# Patient Record
Sex: MALE | Race: WHITE | NOT HISPANIC OR LATINO | Employment: OTHER | URBAN - METROPOLITAN AREA
[De-identification: names, ages, dates, MRNs, and addresses within clinical notes are randomized per-mention and may not be internally consistent; named-entity substitution may affect disease eponyms.]

---

## 2018-05-13 ENCOUNTER — ANESTHESIA - HEALTHEAST (OUTPATIENT)
Dept: SURGERY | Facility: HOSPITAL | Age: 73
End: 2018-05-13

## 2018-05-14 ENCOUNTER — SURGERY - HEALTHEAST (OUTPATIENT)
Dept: SURGERY | Facility: HOSPITAL | Age: 73
End: 2018-05-14

## 2020-07-31 ENCOUNTER — AMBULATORY - HEALTHEAST (OUTPATIENT)
Dept: SURGERY | Facility: CLINIC | Age: 75
End: 2020-07-31

## 2020-07-31 DIAGNOSIS — Z11.59 ENCOUNTER FOR SCREENING FOR OTHER VIRAL DISEASES: ICD-10-CM

## 2020-09-15 ENCOUNTER — AMBULATORY - HEALTHEAST (OUTPATIENT)
Dept: FAMILY MEDICINE | Facility: CLINIC | Age: 75
End: 2020-09-15

## 2020-09-15 DIAGNOSIS — Z11.59 ENCOUNTER FOR SCREENING FOR OTHER VIRAL DISEASES: ICD-10-CM

## 2020-09-16 ASSESSMENT — MIFFLIN-ST. JEOR: SCORE: 1950.55

## 2020-09-17 ENCOUNTER — COMMUNICATION - HEALTHEAST (OUTPATIENT)
Dept: SCHEDULING | Facility: CLINIC | Age: 75
End: 2020-09-17

## 2020-09-17 ENCOUNTER — ANESTHESIA - HEALTHEAST (OUTPATIENT)
Dept: SURGERY | Facility: CLINIC | Age: 75
End: 2020-09-17

## 2020-09-18 ENCOUNTER — SURGERY - HEALTHEAST (OUTPATIENT)
Dept: SURGERY | Facility: CLINIC | Age: 75
End: 2020-09-18

## 2020-09-18 ASSESSMENT — MIFFLIN-ST. JEOR: SCORE: 1929.23

## 2021-05-31 ENCOUNTER — RECORDS - HEALTHEAST (OUTPATIENT)
Dept: ADMINISTRATIVE | Facility: CLINIC | Age: 76
End: 2021-05-31

## 2021-06-01 VITALS — WEIGHT: 261 LBS | BODY MASS INDEX: 32.62 KG/M2

## 2021-06-05 VITALS — BODY MASS INDEX: 30.25 KG/M2 | WEIGHT: 243.3 LBS | HEIGHT: 75 IN

## 2021-06-11 NOTE — ANESTHESIA CARE TRANSFER NOTE
Last vitals:   Vitals:    09/18/20 0848   BP: 162/72   Pulse: 72   Resp: 14   Temp: 36.6  C (97.8  F)   SpO2: 99%     Patient's level of consciousness is drowsy  Spontaneous respirations: yes  Maintains airway independently: yes  Dentition unchanged: yes  Oropharynx: oropharynx clear of all foreign objects    QCDR Measures:  ASA# 20 - Surgical Safety Checklist: WHO surgical safety checklist completed prior to induction    PQRS# 430 - Adult PONV Prevention: 4558F - Pt received => 2 anti-emetic agents (different classes) preop & intraop  ASA# 8 - Peds PONV Prevention: NA - Not pediatric patient, not GA or 2 or more risk factors NOT present  PQRS# 424 - Luz-op Temp Management: 4559F - At least one body temp DOCUMENTED => 35.5C or 95.9F within required timeframe  PQRS# 426 - PACU Transfer Protocol: - Transfer of care checklist used  ASA# 14 - Acute Post-op Pain: ASA14B - Patient did NOT experience pain >= 7 out of 10

## 2021-06-11 NOTE — ANESTHESIA POSTPROCEDURE EVALUATION
Patient: Marcio Jenkins  Procedure(s):  RIGHT EYE EXTERNAL DACRYOCYSTORHINOSTOMY (Right)  Anesthesia type: general    Patient location: Phase II Recovery  Last vitals:   Vitals Value Taken Time   /80 9/18/2020 10:00 AM   Temp 36.8  C (98.2  F) 9/18/2020  9:30 AM   Pulse 66 9/18/2020 10:02 AM   Resp 16 9/18/2020 10:00 AM   SpO2 95 % 9/18/2020 10:02 AM   Vitals shown include unvalidated device data.  Post vital signs: stable  Level of consciousness: awake and responds to simple questions  Post-anesthesia pain: pain controlled  Post-anesthesia nausea and vomiting: no  Pulmonary: unassisted, return to baseline  Cardiovascular: stable and blood pressure at baseline  Hydration: adequate  Anesthetic events: no    QCDR Measures:  ASA# 11 - Luz-op Cardiac Arrest: ASA11B - Patient did NOT experience unanticipated cardiac arrest  ASA# 12 - Luz-op Mortality Rate: ASA12B - Patient did NOT die  ASA# 13 - PACU Re-Intubation Rate: ASA13B - Patient did NOT require a new airway mgmt  ASA# 10 - Composite Anes Safety: ASA10A - No serious adverse event    Additional Notes:

## 2021-06-11 NOTE — ANESTHESIA PREPROCEDURE EVALUATION
Anesthesia Evaluation      Patient summary reviewed   No history of anesthetic complications     Airway   Mallampati: II  Neck ROM: full   Pulmonary - negative ROS and normal exam   (-) asthma, shortness of breath, recent URI, sleep apnea                         Cardiovascular - normal exam  Exercise tolerance: > or = 4 METS  (+) , hypercholesterolemia,     ECG reviewed  Rhythm: regular  Rate: normal,         Neuro/Psych    (+) neuromuscular disease,    (-) no CVA    Comments: [place] [facility].    Endo/Other    (+) obesity,      GI/Hepatic/Renal    (+) GERD,   chronic renal disease,     Comments: BPH.          Dental    (+) caps                         Anesthesia Plan  Planned anesthetic: general LMA  Decadron, Zofran.  Diprivan infusion.  Anectine PRN.  Ketamine.  ASA 2   Induction: intravenous   Anesthetic plan and risks discussed with: patient  Anesthesia plan special considerations: antiemetics,   Post-op plan: routine recovery

## 2021-06-18 NOTE — ANESTHESIA POSTPROCEDURE EVALUATION
Patient: Marcio Jenkins  CYSTOSCOPY QUANTA LASER TRANS URETHRAL RESECTION OF PROSTATE   Anesthesia type: general    Patient location: PACU  Last vitals:   Vitals:    05/14/18 1500   BP: 141/72   Pulse: 61   Resp: 16   Temp:    SpO2: 94%     Post vital signs: stable  Level of consciousness: awake and responds to simple questions  Post-anesthesia pain: pain controlled  Post-anesthesia nausea and vomiting: no  Pulmonary: unassisted, return to baseline  Cardiovascular: stable and blood pressure at baseline  Hydration: adequate  Anesthetic events: no    QCDR Measures:  ASA# 11 - Luz-op Cardiac Arrest: ASA11B - Patient did NOT experience unanticipated cardiac arrest  ASA# 12 - Luz-op Mortality Rate: ASA12B - Patient did NOT die  ASA# 13 - PACU Re-Intubation Rate: ASA13B - Patient did NOT require a new airway mgmt  ASA# 10 - Composite Anes Safety: ASA10A - No serious adverse event    Additional Notes:

## 2021-06-18 NOTE — ANESTHESIA CARE TRANSFER NOTE
Last vitals:   Vitals:    05/14/18 1346   BP: 157/89   Pulse: 75   Resp: 12   Temp: 36.1  C (97  F)   SpO2: 98%     Patient's level of consciousness is drowsy  Spontaneous respirations: yes  Maintains airway independently: yes  Dentition unchanged: yes  Oropharynx: oropharynx clear of all foreign objects    QCDR Measures:  ASA# 20 - Surgical Safety Checklist: WHO surgical safety checklist completed prior to induction  PQRS# 430 - Adult PONV Prevention: 4558F - Pt received => 2 anti-emetic agents (different classes) preop & intraop  ASA# 8 - Peds PONV Prevention: NA - Not pediatric patient, not GA or 2 or more risk factors NOT present  PQRS# 424 - Luz-op Temp Management: 4559F - At least one body temp DOCUMENTED => 35.5C or 95.9F within required timeframe  PQRS# 426 - PACU Transfer Protocol: - Transfer of care checklist used  ASA# 14 - Acute Post-op Pain: ASA14B - Patient did NOT experience pain >= 7 out of 10

## 2021-06-18 NOTE — ANESTHESIA PREPROCEDURE EVALUATION
Anesthesia Evaluation      Patient summary reviewed   No history of anesthetic complications     Airway   Mallampati: II  Neck ROM: full   Pulmonary - negative ROS and normal exam    breath sounds clear to auscultation  (-) asthma, shortness of breath, recent URI, sleep apnea                         Cardiovascular - normal exam  Exercise tolerance: > or = 4 METS  (+) , hypercholesterolemia,     ECG reviewed  Rhythm: regular  Rate: normal,         Neuro/Psych - negative ROS   (-) no CVA    Endo/Other    (+) obesity,      GI/Hepatic/Renal    (+) GERD,             Dental    (+) caps                       Anesthesia Plan  Planned anesthetic: general endotracheal    ASA 2   Induction: intravenous   Anesthetic plan and risks discussed with: patient  Anesthesia plan special considerations: antiemetics,   Post-op plan: routine recovery

## 2024-01-03 NOTE — H&P (VIEW-ONLY)
[facility]      Preoperative Consultation   Marcio Jenkins   : 1945   Gender: male    Date of Encounter: 1/3/2024    Nursing Notes:   Mikayla Morales  1/3/2024  9:31 AM  Sign at exiting of workspace  Chief Complaint   Patient presents with     Preoperative Exam     DOS: 2024  Procedure: Right total knee replacement  Facility: [facility]  Surgeon:   with [facility]         Additional visit information (chief complaint/health maintenance) shared by patient:       There are no preventive care reminders to display for this patient.    Health maintenance reviewed with patient Yes    Patient presents for an in-person office visit: alone  Communication Method: Patient is active on [employer]Ã¢â€žÂ¢ and has been instructed that results/communications will be made via [employer]Ã¢â€žÂ¢  If a phone call is needed, the preferred number is:  Mobile   Home Phone [phone]   Mobile [phone]     May we leave a detailed message at this number? Yes      Marcio who presents for preop evaluation undergoing for right total knee replacement.    Date of Surgery: 2024  Surgical Specialty: Orthopedic/Spine  Sage Levine MD - [facility]  [facility]/Surgical Facility:  [facility]  Fax number: [phone]  Surgery type: inpatient  Primary Physician: MD Mikayla London LPN 1/3/2024 9:31 AM                History of Present Illness   Patient is a 78-year-old with a history of chronic right knee pain symptoms onset for years and progressively worsening.  He is currently working with [facility] had plain film x-rays of the right knee 2023 that showed degenerative changes.  Bone-on-bone osteoarthritis and was recommended a total right knee replacement.    Otherwise he is in his normal state of health.     Review of Systems   A comprehensive review of systems was negative except for items noted in HPI.    Patient Active Problem List   Diagnosis Code     Sensorineural hearing loss,  unspecified H90.5     GERD (gastroesophageal reflux disease) K21.9     Personal history of squamous cell carcinoma of skin Z85.828     History of adenomatous polyp of colon Z86.010     On statin therapy due to risk of future cardiovascular event Z79.899     Postherpetic neuralgia B02.29     Vitreous hemorrhage of left eye (HC) H43.12     S/P total knee arthroplasty, left - Dr. Levine Z96.652     BPH with obstruction/lower urinary tract symptoms N40.1, N13.8     Stricture of male urethra N35.919     Elevated PSA R97.20     Current Outpatient Medications   Medication Sig     atorvastatin (LIPITOR) 20 mg tablet Take 1 Tablet (20 mg) by mouth once daily.     cholecalciferol (VITAMIN D-3) 400 unit tablet Take 400 Units by mouth once daily.     famotidine (PEPCID) 40 mg tablet Take 1 Tablet (40 mg) by mouth at bedtime.     finasteride (PROSCAR) 5 mg tablet finasteride 5 mg tablet   TAKE 1 TABLET BY MOUTH ONCE DAILY     naproxen (ALEVE) 220 mg tablet Take 220 mg by mouth every 12 hours if needed.       pregabalin (LYRICA) 200 mg capsule TAKE 1 CAPSULE BY MOUTH THREE TIMES DAILY     timoloL maleate (TIMOPTIC) 0.25 % ophthalmic solution Place 1 Drop into left eye once daily.     vit C,B-Zl-hjiez-lutein-zeaxan (PreserVision AREDS-2) capsule Take 1 Capsule by mouth once daily.     No current facility-administered medications for this visit.   No Known Allergies  Past Surgical History:   . Laterality Date     CATARACT REMOVAL Right 05/06/2014     CATARACT REMOVAL Left 5/10/2014-5/15/2014     COLONOSCOPY SCREENING  2002;12/2007, 12/2103    Repeat in 5 years     HX CYSTOCSCOPY & TURP(Quanta laser)  04/22/2015    [facility]      HX CYSTOSCOPY/URETEROSCOPY & RIGHT URETERAL STENT  05/01/2015     HX LEFT KNEE ARTHROSCOPY  11/01/2006     HX LEFT KNEE REPLACEMENT(aka TKA)  01/04/2008     HX RIGHT KNEE ARTHROSCOPY       HX RT INGUINAL HERNIA REPAIR  07/14/2008     HX VITRECTOMY 23 Gauge System, Laser, SF6 Gas 16% Left 5/206/2015      "[facility]     WISDOM TEETH EXTRACTION       YAG LASER EYE PROCEDURE Right     Dr. Suárez     Social History     Tobacco Use     Smoking status: Former     Current packs/day: 0.00     Average packs/day: 1 pack/day for 15.0 years (15.0 ttl pk-yrs)     Types: Cigarettes     Start date: 1970     Quit date: 1985     Years since quittin.0     Smokeless tobacco: Never   Vaping Use     Vaping Use: Never used   Substance Use Topics     Alcohol use: Yes     Alcohol/week: 0.0 standard drinks of alcohol     Comment: rare, maybe 1-2/wk     Drug use: No     Family History   Problem Relation Age of Onset     Cancer-colon Father         80's     Heart Disease Father         CABG @ 79     Arthritis Father      Hypertension Mother      Heart Disease Mother         ASCVD @ 82     Arthritis Mother      Hyperlipidemia Mother        PAST DIFFICULTY WITH ANESTHESIA: None     Physical Exam   /80 (Cuff Site: Right Arm, Position: Sitting, Cuff Size: Adult Large)   Pulse 66   Temp 97.7  F (36.5  C) (Oral)   Resp 12   Ht 1.9 m (6' 2.8\")   Wt 116.5 kg (256 lb 12.8 oz)   SpO2 98%   BMI 32.27 kg/m   Body mass index is 32.27 kg/m .  General Appearance: Pleasant, alert, appropriate appearance for age. No acute distress  Head Exam: Normocephalic, without obvious abnormality.  Eye Exam: Normal external eye, conjunctiva, lids, cornea. ANASTASIYA.  Ear Exam: Normal TM's bilaterally. Normal auditory canals and external ears. Non-tender.  OroPharynx Exam: Normal.  Chest/Respiratory Exam: Normal chest wall and respirations. Clear to auscultation.  Cardiovascular Exam: Regular rate and rhythm. S1, S2, no murmur, click, gallop, or rubs.  Lymphatic Exam: Normal.  Skin: Normal.  Neurologic Exam: Normal.  Psychiatric Exam: Alert and oriented, appropriate affect.     Assessment / Plan     The Pre-Op Tool    Recommendations      Intermediate Risk Procedure    Risk of CV Complication (RCRI)  1%    Current Cardiac Status  Good exertional " capacity ( > 4 mets )    Cardiac History  No history of coronary artery disease           Labs  HGB within last 30 days  Potassium within last 30 days  Creatinine within last 30 days  EKG  Baseline EKG within the last 12 months  CXR  Not indicated    Stress Testing  Not indicated    * Testing recommendations are intended to assist, but not direct, clinical decisions.           Type & Screen should be obtained by Anesthesia only if the risk of transfusion is > 5% for the procedure         Hold Naproxen (Aleve) for 4 days prior to the procedure.    * Medication recommendations are not intended to be exhaustive; they are limited to common medications that are potentially dangerous if incorrectly managed          Labs  * Data supports elimination of  routine  laboratory testing in favor of focused,  indicated  testing based on medical co-morbidities. A 2009 study randomized 1061 patients undergoing ambulatory, non-cataract surgery to routine or to indicated testing. Perioperative adverse events were similar (Anesthesia & Analgesia 2009;108:467-75; Anesthesiol. Clin. 2016 Mar;34(1):43-58).  Stress Testing  * The current ACC/AHA guideline states that 'non-invasive stress testing is not useful for patients [with no clinical risk factors] undergoing noncardiac surgery' (JACC. 2014;64(21);e1-76.).     Session ID: [id number]  Endnotes and bibliography available upon request: [email or web address]    Labs: pending  ECG: yes    ICD-10-CM    1. Pre-op exam  Z01.818 IN READING EKG - NO CHARGE, COMP ONLY     EKG 12 LEAD     IN ECG ROUTINE ECG W/LEAST 12 LDS W/I&R     HEMOGLOBIN     BASIC METABOLIC PANEL      2. Osteoarthritis of right knee, unspecified osteoarthritis type  M17.11       3. On statin therapy due to risk of future cardiovascular event  Z79.899       4. Gastroesophageal reflux disease, unspecified whether esophagitis present  K21.9       5. Postherpetic neuralgia  B02.29       6.  BPH with obstruction/lower urinary tract symptoms  N40.1     N13.8         Stable 1st degree AV block, RBBB since last visit. Had stress test NM lexiscan with normal perfusion and LVEF. Otherwise will update blood work including Hgb and BMP stable otherwise.    Patient is cleared, for planned procedure.   Electronically Signed by:   Fernando Nino MD  1/3/2024

## 2024-01-10 RX ORDER — PREGABALIN 200 MG/1
200 CAPSULE ORAL 3 TIMES DAILY
COMMUNITY

## 2024-01-10 RX ORDER — FINASTERIDE 5 MG/1
5 TABLET, FILM COATED ORAL DAILY
COMMUNITY

## 2024-01-10 RX ORDER — FAMOTIDINE 20 MG/1
20 TABLET, FILM COATED ORAL DAILY
Status: ON HOLD | COMMUNITY
End: 2024-01-19

## 2024-01-10 NOTE — PROGRESS NOTES
Planning to discharge home on POD 1 in the morning with his wife helping him.       01/10/24 1228   Discharge Planning   Patient/Family Anticipates Transition to home with family  (outpatient PT arranged at [facility])   Concerns to be Addressed no discharge needs identified   Living Arrangements   People in Home spouse   Type of Residence Private Residence   Is your private residence a single family home or apartment? Single family home   Number of Stairs, Within Home, Primary none  (3 exterior steps)   Stair Railings, Within Home, Primary railings safe and in good condition   Once home, are you able to live on one level? Yes   Which level? Main Level   Bathroom Shower/Tub Walk-in shower   Equipment Currently Used at Home grab bar, tub/shower;shower chair;raised toilet seat  (Has a cane, crutches, and walker at home)   Support System   Support Systems Spouse/Significant Other  (wife, Alejandra)   Do you have someone available to stay with you one or two nights once you are home? Yes   Education   Patient attended total joint pre-op class/received pre-op teaching  email/phone call

## 2024-01-18 NOTE — PROGRESS NOTES
I am evaluating this patient for upcoming Right Total Knee Arthroplasty with Dr. Levine at [facility] on 1/19/24:    - Reviewed preop H&P and labs. Cleared for surgery by PCP. Medical history significant for:    1) 1st Degree AV Block and Right Bundle Branch Block: Stable per PCP. Denies any chest pain/chest discomfort, JIM, palpitations, dizziness or syncope.  Able to perform >4 METS.  Had NM stress test 2/18/2021 that was negative for myocardial ischemia.  Cleared by PCP for surgery at low risk for serious perioperative cardiovascular complications (1% per RCRI).  2) History of DVT: Appears this was provoked following a right knee scope surgery in 2007.  Denies any known coagulopathy.  Has not had any further issues with blood clots since. Not on anticoagulation or ASA.  Patient may be at mildly increased risk for postop VTE given this history.  Will defer to Dr. Levine's team for decision on postop VTE prophylaxis.   3) Hyperlipidemia: On atorvastatin.  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  On pregabalin.  6) GERD: On famotidine.  7) Benign Prostatic Hyperplasia (BPH): On finasteride.  At increased risk for postop urinary retention.  Recommend continuing on finasteride perioperatively and close monitoring for urine retention with frequent bladder scanning as needed.    Patient appears medically optimized for surgery.  Discharge plan below:    Planning to discharge home on POD 1 in the morning with his wife helping him.     01/10/24 1228   Discharge Planning   Patient/Family Anticipates Transition to home with family  (outpatient PT arranged at [facility])   Concerns to be Addressed no discharge needs identified   Living Arrangements   People in Home spouse   Type of Residence Private Residence   Is your private residence a single family home or apartment? Single family  home   Number of Stairs, Within Home, Primary none  (3 exterior steps)   Stair Railings, Within Home, Primary railings safe and in good condition   Once home, are you able to live on one level? Yes   Which level? Main Level   Bathroom Shower/Tub Walk-in shower   Equipment Currently Used at Home grab bar, tub/shower;shower chair;raised toilet seat  (Has a cane, crutches, and walker at home)   Support System   Support Systems Spouse/Significant Other  (wife, Alejandra)   Do you have someone available to stay with you one or two nights once you are home? Yes       ELIZABETH Gomez, CNP   Advanced Practice Nurse Navigator- Orthopedics  [facility]   Office Phone: [phone]  Direct Fax: [phone]

## 2024-01-19 ENCOUNTER — HOSPITAL ENCOUNTER (OUTPATIENT)
Facility: CLINIC | Age: 79
Discharge: HOME | End: 2024-01-20
Attending: ORTHOPAEDIC SURGERY | Admitting: ORTHOPAEDIC SURGERY
Payer: COMMERCIAL

## 2024-01-19 ENCOUNTER — ANESTHESIA EVENT (OUTPATIENT)
Dept: SURGERY | Facility: CLINIC | Age: 79
End: 2024-01-19
Payer: COMMERCIAL

## 2024-01-19 ENCOUNTER — ANESTHESIA (OUTPATIENT)
Dept: SURGERY | Facility: CLINIC | Age: 79
End: 2024-01-19
Payer: COMMERCIAL

## 2024-01-19 DIAGNOSIS — Z96.651 H/O TOTAL KNEE REPLACEMENT, RIGHT: Primary | ICD-10-CM

## 2024-01-19 PROBLEM — M17.11 RIGHT KNEE DJD: Status: ACTIVE | Noted: 2024-01-19

## 2024-01-19 LAB
CREAT SERPL-MCNC: 0.83 MG/DL (ref 0.67–1.17)
EGFRCR SERPLBLD CKD-EPI 2021: >90 ML/MIN/1.73M2
ERYTHROCYTE [DISTWIDTH] IN BLOOD BY AUTOMATED COUNT: 13.6 % (ref 10–15)
HCT VFR BLD AUTO: 44.6 % (ref 40–53)
HGB BLD-MCNC: 14.8 G/DL (ref 13.3–17.7)
MCH RBC QN AUTO: 30.1 PG (ref 26.5–33)
MCHC RBC AUTO-ENTMCNC: 33.2 G/DL (ref 31.5–36.5)
MCV RBC AUTO: 91 FL (ref 78–100)
PLATELET # BLD AUTO: 225 10E3/UL (ref 150–450)
POTASSIUM SERPL-SCNC: 4.3 MMOL/L (ref 3.4–5.3)
RBC # BLD AUTO: 4.91 10E6/UL (ref 4.4–5.9)
WBC # BLD AUTO: 5.8 10E3/UL (ref 4–11)

## 2024-01-19 PROCEDURE — C1713 ANCHOR/SCREW BN/BN,TIS/BN: HCPCS | Performed by: ORTHOPAEDIC SURGERY

## 2024-01-19 PROCEDURE — 250N000011 HC RX IP 250 OP 636: Performed by: ORTHOPAEDIC SURGERY

## 2024-01-19 PROCEDURE — 272N000001 HC OR GENERAL SUPPLY STERILE: Performed by: ORTHOPAEDIC SURGERY

## 2024-01-19 PROCEDURE — 250N000013 HC RX MED GY IP 250 OP 250 PS 637: Performed by: STUDENT IN AN ORGANIZED HEALTH CARE EDUCATION/TRAINING PROGRAM

## 2024-01-19 PROCEDURE — 250N000011 HC RX IP 250 OP 636: Performed by: PHYSICIAN ASSISTANT

## 2024-01-19 PROCEDURE — 258N000003 HC RX IP 258 OP 636: Performed by: ANESTHESIOLOGY

## 2024-01-19 PROCEDURE — 710N000010 HC RECOVERY PHASE 1, LEVEL 2, PER MIN: Performed by: ORTHOPAEDIC SURGERY

## 2024-01-19 PROCEDURE — 250N000025 HC SEVOFLURANE, PER MIN: Performed by: ORTHOPAEDIC SURGERY

## 2024-01-19 PROCEDURE — 99205 OFFICE O/P NEW HI 60 MIN: CPT | Performed by: STUDENT IN AN ORGANIZED HEALTH CARE EDUCATION/TRAINING PROGRAM

## 2024-01-19 PROCEDURE — 250N000009 HC RX 250

## 2024-01-19 PROCEDURE — 258N000001 HC RX 258: Performed by: ORTHOPAEDIC SURGERY

## 2024-01-19 PROCEDURE — 250N000009 HC RX 250: Performed by: PHYSICIAN ASSISTANT

## 2024-01-19 PROCEDURE — 258N000003 HC RX IP 258 OP 636: Performed by: ORTHOPAEDIC SURGERY

## 2024-01-19 PROCEDURE — 370N000017 HC ANESTHESIA TECHNICAL FEE, PER MIN: Performed by: ORTHOPAEDIC SURGERY

## 2024-01-19 PROCEDURE — 999N000141 HC STATISTIC PRE-PROCEDURE NURSING ASSESSMENT: Performed by: ORTHOPAEDIC SURGERY

## 2024-01-19 PROCEDURE — 250N000011 HC RX IP 250 OP 636: Performed by: ANESTHESIOLOGY

## 2024-01-19 PROCEDURE — 36415 COLL VENOUS BLD VENIPUNCTURE: CPT | Performed by: PHYSICIAN ASSISTANT

## 2024-01-19 PROCEDURE — 82565 ASSAY OF CREATININE: CPT | Performed by: PHYSICIAN ASSISTANT

## 2024-01-19 PROCEDURE — 250N000011 HC RX IP 250 OP 636

## 2024-01-19 PROCEDURE — 258N000003 HC RX IP 258 OP 636

## 2024-01-19 PROCEDURE — 360N000077 HC SURGERY LEVEL 4, PER MIN: Performed by: ORTHOPAEDIC SURGERY

## 2024-01-19 PROCEDURE — 84132 ASSAY OF SERUM POTASSIUM: CPT | Performed by: PHYSICIAN ASSISTANT

## 2024-01-19 PROCEDURE — 250N000013 HC RX MED GY IP 250 OP 250 PS 637: Performed by: ORTHOPAEDIC SURGERY

## 2024-01-19 PROCEDURE — C1776 JOINT DEVICE (IMPLANTABLE): HCPCS | Performed by: ORTHOPAEDIC SURGERY

## 2024-01-19 PROCEDURE — 250N000009 HC RX 250: Performed by: ORTHOPAEDIC SURGERY

## 2024-01-19 PROCEDURE — 85027 COMPLETE CBC AUTOMATED: CPT | Performed by: PHYSICIAN ASSISTANT

## 2024-01-19 PROCEDURE — 250N000013 HC RX MED GY IP 250 OP 250 PS 637: Performed by: PHYSICIAN ASSISTANT

## 2024-01-19 DEVICE — TIBIAL BEARING INSERT - CS
Type: IMPLANTABLE DEVICE | Site: KNEE | Status: FUNCTIONAL
Brand: TRIATHLON

## 2024-01-19 DEVICE — PATELLA
Type: IMPLANTABLE DEVICE | Site: KNEE | Status: FUNCTIONAL
Brand: TRIATHLON

## 2024-01-19 DEVICE — CRUCIATE RETAINING FEMORAL
Type: IMPLANTABLE DEVICE | Site: KNEE | Status: FUNCTIONAL
Brand: TRIATHLON

## 2024-01-19 DEVICE — IMP BONE CEMENT SIMPLEX W/GENTAMICIN 6195-1-001: Type: IMPLANTABLE DEVICE | Site: KNEE | Status: FUNCTIONAL

## 2024-01-19 DEVICE — PRIMARY TIBIAL BASEPLATE
Type: IMPLANTABLE DEVICE | Site: KNEE | Status: FUNCTIONAL
Brand: TRIATHLON

## 2024-01-19 RX ORDER — OXYCODONE HYDROCHLORIDE 5 MG/1
10 TABLET ORAL EVERY 4 HOURS PRN
Status: DISCONTINUED | OUTPATIENT
Start: 2024-01-19 | End: 2024-01-20 | Stop reason: HOSPADM

## 2024-01-19 RX ORDER — NALOXONE HYDROCHLORIDE 0.4 MG/ML
0.4 INJECTION, SOLUTION INTRAMUSCULAR; INTRAVENOUS; SUBCUTANEOUS
Status: DISCONTINUED | OUTPATIENT
Start: 2024-01-19 | End: 2024-01-20 | Stop reason: HOSPADM

## 2024-01-19 RX ORDER — ACETAMINOPHEN 325 MG/1
650 TABLET ORAL EVERY 4 HOURS PRN
Qty: 100 TABLET | Refills: 0 | Status: SHIPPED | OUTPATIENT
Start: 2024-01-19

## 2024-01-19 RX ORDER — ACETAMINOPHEN 325 MG/1
975 TABLET ORAL EVERY 8 HOURS
Qty: 27 TABLET | Refills: 0 | Status: DISCONTINUED | OUTPATIENT
Start: 2024-01-19 | End: 2024-01-20 | Stop reason: HOSPADM

## 2024-01-19 RX ORDER — NALOXONE HYDROCHLORIDE 0.4 MG/ML
0.2 INJECTION, SOLUTION INTRAMUSCULAR; INTRAVENOUS; SUBCUTANEOUS
Status: DISCONTINUED | OUTPATIENT
Start: 2024-01-19 | End: 2024-01-20 | Stop reason: HOSPADM

## 2024-01-19 RX ORDER — PROCHLORPERAZINE MALEATE 5 MG
5 TABLET ORAL EVERY 6 HOURS PRN
Status: DISCONTINUED | OUTPATIENT
Start: 2024-01-19 | End: 2024-01-20 | Stop reason: HOSPADM

## 2024-01-19 RX ORDER — AMOXICILLIN 250 MG
1-2 CAPSULE ORAL 2 TIMES DAILY
Start: 2024-01-19

## 2024-01-19 RX ORDER — SODIUM CHLORIDE, SODIUM LACTATE, POTASSIUM CHLORIDE, CALCIUM CHLORIDE 600; 310; 30; 20 MG/100ML; MG/100ML; MG/100ML; MG/100ML
INJECTION, SOLUTION INTRAVENOUS CONTINUOUS
Status: DISCONTINUED | OUTPATIENT
Start: 2024-01-19 | End: 2024-01-19 | Stop reason: HOSPADM

## 2024-01-19 RX ORDER — TRANEXAMIC ACID 650 MG/1
1950 TABLET ORAL ONCE
Status: COMPLETED | OUTPATIENT
Start: 2024-01-19 | End: 2024-01-19

## 2024-01-19 RX ORDER — BUPIVACAINE HYDROCHLORIDE 5 MG/ML
INJECTION, SOLUTION EPIDURAL; INTRACAUDAL
Status: COMPLETED | OUTPATIENT
Start: 2024-01-19 | End: 2024-01-19

## 2024-01-19 RX ORDER — HYDROMORPHONE HCL IN WATER/PF 6 MG/30 ML
0.2 PATIENT CONTROLLED ANALGESIA SYRINGE INTRAVENOUS
Status: DISCONTINUED | OUTPATIENT
Start: 2024-01-19 | End: 2024-01-20 | Stop reason: HOSPADM

## 2024-01-19 RX ORDER — HALOPERIDOL 5 MG/ML
1 INJECTION INTRAMUSCULAR
Status: DISCONTINUED | OUTPATIENT
Start: 2024-01-19 | End: 2024-01-19 | Stop reason: HOSPADM

## 2024-01-19 RX ORDER — ONDANSETRON 2 MG/ML
4 INJECTION INTRAMUSCULAR; INTRAVENOUS EVERY 6 HOURS PRN
Status: DISCONTINUED | OUTPATIENT
Start: 2024-01-19 | End: 2024-01-20 | Stop reason: HOSPADM

## 2024-01-19 RX ORDER — ASPIRIN 325 MG
325 TABLET, DELAYED RELEASE (ENTERIC COATED) ORAL DAILY
Start: 2024-01-19

## 2024-01-19 RX ORDER — ACETAMINOPHEN 325 MG/1
975 TABLET ORAL ONCE
Status: COMPLETED | OUTPATIENT
Start: 2024-01-19 | End: 2024-01-19

## 2024-01-19 RX ORDER — FENTANYL CITRATE 50 UG/ML
INJECTION, SOLUTION INTRAMUSCULAR; INTRAVENOUS PRN
Status: DISCONTINUED | OUTPATIENT
Start: 2024-01-19 | End: 2024-01-19

## 2024-01-19 RX ORDER — METHOCARBAMOL 500 MG/1
500 TABLET, FILM COATED ORAL EVERY 6 HOURS PRN
Status: DISCONTINUED | OUTPATIENT
Start: 2024-01-19 | End: 2024-01-20 | Stop reason: HOSPADM

## 2024-01-19 RX ORDER — LIDOCAINE HYDROCHLORIDE 10 MG/ML
INJECTION, SOLUTION INFILTRATION; PERINEURAL PRN
Status: DISCONTINUED | OUTPATIENT
Start: 2024-01-19 | End: 2024-01-19

## 2024-01-19 RX ORDER — BISACODYL 10 MG
10 SUPPOSITORY, RECTAL RECTAL DAILY PRN
Status: DISCONTINUED | OUTPATIENT
Start: 2024-01-19 | End: 2024-01-20 | Stop reason: HOSPADM

## 2024-01-19 RX ORDER — LIDOCAINE 40 MG/G
CREAM TOPICAL
Status: DISCONTINUED | OUTPATIENT
Start: 2024-01-19 | End: 2024-01-19 | Stop reason: HOSPADM

## 2024-01-19 RX ORDER — POLYETHYLENE GLYCOL 3350 17 G/17G
17 POWDER, FOR SOLUTION ORAL DAILY
Status: DISCONTINUED | OUTPATIENT
Start: 2024-01-20 | End: 2024-01-20 | Stop reason: HOSPADM

## 2024-01-19 RX ORDER — FAMOTIDINE 20 MG/1
20 TABLET, FILM COATED ORAL 2 TIMES DAILY
Status: DISCONTINUED | OUTPATIENT
Start: 2024-01-19 | End: 2024-01-20 | Stop reason: HOSPADM

## 2024-01-19 RX ORDER — OXYCODONE HYDROCHLORIDE 5 MG/1
5-10 TABLET ORAL EVERY 4 HOURS PRN
Qty: 30 TABLET | Refills: 0 | Status: SHIPPED | OUTPATIENT
Start: 2024-01-19

## 2024-01-19 RX ORDER — OXYCODONE HYDROCHLORIDE 5 MG/1
5-10 TABLET ORAL EVERY 4 HOURS PRN
Qty: 30 TABLET | Refills: 0 | Status: SHIPPED | OUTPATIENT
Start: 2024-01-19 | End: 2024-01-19

## 2024-01-19 RX ORDER — ONDANSETRON 4 MG/1
4 TABLET, ORALLY DISINTEGRATING ORAL EVERY 30 MIN PRN
Status: DISCONTINUED | OUTPATIENT
Start: 2024-01-19 | End: 2024-01-19 | Stop reason: HOSPADM

## 2024-01-19 RX ORDER — PROPOFOL 10 MG/ML
INJECTION, EMULSION INTRAVENOUS CONTINUOUS PRN
Status: DISCONTINUED | OUTPATIENT
Start: 2024-01-19 | End: 2024-01-19

## 2024-01-19 RX ORDER — PREGABALIN 100 MG/1
200 CAPSULE ORAL 3 TIMES DAILY
Status: DISCONTINUED | OUTPATIENT
Start: 2024-01-19 | End: 2024-01-20 | Stop reason: HOSPADM

## 2024-01-19 RX ORDER — GLYCOPYRROLATE 0.2 MG/ML
INJECTION, SOLUTION INTRAMUSCULAR; INTRAVENOUS PRN
Status: DISCONTINUED | OUTPATIENT
Start: 2024-01-19 | End: 2024-01-19

## 2024-01-19 RX ORDER — FENTANYL CITRATE 50 UG/ML
50 INJECTION, SOLUTION INTRAMUSCULAR; INTRAVENOUS EVERY 5 MIN PRN
Status: DISCONTINUED | OUTPATIENT
Start: 2024-01-19 | End: 2024-01-19 | Stop reason: HOSPADM

## 2024-01-19 RX ORDER — DIPHENHYDRAMINE HCL 12.5 MG/5ML
12.5 SOLUTION ORAL EVERY 6 HOURS PRN
Status: DISCONTINUED | OUTPATIENT
Start: 2024-01-19 | End: 2024-01-20 | Stop reason: HOSPADM

## 2024-01-19 RX ORDER — HYDROMORPHONE HCL IN WATER/PF 6 MG/30 ML
0.4 PATIENT CONTROLLED ANALGESIA SYRINGE INTRAVENOUS
Status: DISCONTINUED | OUTPATIENT
Start: 2024-01-19 | End: 2024-01-20 | Stop reason: HOSPADM

## 2024-01-19 RX ORDER — TIMOLOL MALEATE 2.5 MG/ML
1 SOLUTION/ DROPS OPHTHALMIC DAILY
Status: DISCONTINUED | OUTPATIENT
Start: 2024-01-20 | End: 2024-01-20 | Stop reason: HOSPADM

## 2024-01-19 RX ORDER — HYDRALAZINE HYDROCHLORIDE 20 MG/ML
2.5-5 INJECTION INTRAMUSCULAR; INTRAVENOUS EVERY 10 MIN PRN
Status: DISCONTINUED | OUTPATIENT
Start: 2024-01-19 | End: 2024-01-19 | Stop reason: HOSPADM

## 2024-01-19 RX ORDER — ACETAMINOPHEN 325 MG/1
650 TABLET ORAL EVERY 4 HOURS PRN
Status: DISCONTINUED | OUTPATIENT
Start: 2024-01-22 | End: 2024-01-20 | Stop reason: HOSPADM

## 2024-01-19 RX ORDER — CEFAZOLIN SODIUM 2 G/100ML
2 INJECTION, SOLUTION INTRAVENOUS EVERY 8 HOURS
Qty: 200 ML | Refills: 0 | Status: COMPLETED | OUTPATIENT
Start: 2024-01-19 | End: 2024-01-20

## 2024-01-19 RX ORDER — LABETALOL HYDROCHLORIDE 5 MG/ML
10 INJECTION, SOLUTION INTRAVENOUS
Status: DISCONTINUED | OUTPATIENT
Start: 2024-01-19 | End: 2024-01-19 | Stop reason: HOSPADM

## 2024-01-19 RX ORDER — LIDOCAINE 40 MG/G
CREAM TOPICAL
Status: DISCONTINUED | OUTPATIENT
Start: 2024-01-19 | End: 2024-01-20 | Stop reason: HOSPADM

## 2024-01-19 RX ORDER — SODIUM CHLORIDE, SODIUM LACTATE, POTASSIUM CHLORIDE, CALCIUM CHLORIDE 600; 310; 30; 20 MG/100ML; MG/100ML; MG/100ML; MG/100ML
INJECTION, SOLUTION INTRAVENOUS CONTINUOUS
Status: DISCONTINUED | OUTPATIENT
Start: 2024-01-19 | End: 2024-01-20 | Stop reason: HOSPADM

## 2024-01-19 RX ORDER — HYDROMORPHONE HCL IN WATER/PF 6 MG/30 ML
0.2 PATIENT CONTROLLED ANALGESIA SYRINGE INTRAVENOUS EVERY 5 MIN PRN
Status: DISCONTINUED | OUTPATIENT
Start: 2024-01-19 | End: 2024-01-19 | Stop reason: HOSPADM

## 2024-01-19 RX ORDER — CEFAZOLIN SODIUM/WATER 2 G/20 ML
2 SYRINGE (ML) INTRAVENOUS SEE ADMIN INSTRUCTIONS
Status: DISCONTINUED | OUTPATIENT
Start: 2024-01-19 | End: 2024-01-19 | Stop reason: HOSPADM

## 2024-01-19 RX ORDER — FAMOTIDINE 40 MG/1
40 TABLET, FILM COATED ORAL
COMMUNITY

## 2024-01-19 RX ORDER — MAGNESIUM HYDROXIDE 1200 MG/15ML
LIQUID ORAL PRN
Status: DISCONTINUED | OUTPATIENT
Start: 2024-01-19 | End: 2024-01-19 | Stop reason: HOSPADM

## 2024-01-19 RX ORDER — FINASTERIDE 5 MG/1
5 TABLET, FILM COATED ORAL DAILY
Status: DISCONTINUED | OUTPATIENT
Start: 2024-01-20 | End: 2024-01-20 | Stop reason: HOSPADM

## 2024-01-19 RX ORDER — PROPOFOL 10 MG/ML
INJECTION, EMULSION INTRAVENOUS PRN
Status: DISCONTINUED | OUTPATIENT
Start: 2024-01-19 | End: 2024-01-19

## 2024-01-19 RX ORDER — HYDROMORPHONE HCL IN WATER/PF 6 MG/30 ML
0.4 PATIENT CONTROLLED ANALGESIA SYRINGE INTRAVENOUS EVERY 5 MIN PRN
Status: DISCONTINUED | OUTPATIENT
Start: 2024-01-19 | End: 2024-01-19 | Stop reason: HOSPADM

## 2024-01-19 RX ORDER — ASPIRIN 325 MG
325 TABLET, DELAYED RELEASE (ENTERIC COATED) ORAL DAILY
Status: DISCONTINUED | OUTPATIENT
Start: 2024-01-19 | End: 2024-01-20 | Stop reason: HOSPADM

## 2024-01-19 RX ORDER — FAMOTIDINE 20 MG/1
40 TABLET, FILM COATED ORAL
Status: DISCONTINUED | OUTPATIENT
Start: 2024-01-19 | End: 2024-01-19

## 2024-01-19 RX ORDER — ATORVASTATIN CALCIUM 10 MG/1
20 TABLET, FILM COATED ORAL
Status: DISCONTINUED | OUTPATIENT
Start: 2024-01-19 | End: 2024-01-20 | Stop reason: HOSPADM

## 2024-01-19 RX ORDER — FENTANYL CITRATE 50 UG/ML
25 INJECTION, SOLUTION INTRAMUSCULAR; INTRAVENOUS EVERY 5 MIN PRN
Status: DISCONTINUED | OUTPATIENT
Start: 2024-01-19 | End: 2024-01-19 | Stop reason: HOSPADM

## 2024-01-19 RX ORDER — CEFAZOLIN SODIUM/WATER 2 G/20 ML
2 SYRINGE (ML) INTRAVENOUS
Status: COMPLETED | OUTPATIENT
Start: 2024-01-19 | End: 2024-01-19

## 2024-01-19 RX ORDER — ONDANSETRON 2 MG/ML
INJECTION INTRAMUSCULAR; INTRAVENOUS PRN
Status: DISCONTINUED | OUTPATIENT
Start: 2024-01-19 | End: 2024-01-19

## 2024-01-19 RX ORDER — OXYCODONE HYDROCHLORIDE 5 MG/1
5 TABLET ORAL EVERY 4 HOURS PRN
Status: DISCONTINUED | OUTPATIENT
Start: 2024-01-19 | End: 2024-01-20 | Stop reason: HOSPADM

## 2024-01-19 RX ORDER — FENTANYL CITRATE 50 UG/ML
100 INJECTION, SOLUTION INTRAMUSCULAR; INTRAVENOUS ONCE
Status: COMPLETED | OUTPATIENT
Start: 2024-01-19 | End: 2024-01-19

## 2024-01-19 RX ORDER — ONDANSETRON 2 MG/ML
4 INJECTION INTRAMUSCULAR; INTRAVENOUS EVERY 30 MIN PRN
Status: DISCONTINUED | OUTPATIENT
Start: 2024-01-19 | End: 2024-01-19 | Stop reason: HOSPADM

## 2024-01-19 RX ORDER — HYDRALAZINE HYDROCHLORIDE 10 MG/1
10 TABLET, FILM COATED ORAL 4 TIMES DAILY PRN
Status: DISCONTINUED | OUTPATIENT
Start: 2024-01-19 | End: 2024-01-20 | Stop reason: HOSPADM

## 2024-01-19 RX ORDER — AMOXICILLIN 250 MG
1 CAPSULE ORAL 2 TIMES DAILY
Status: DISCONTINUED | OUTPATIENT
Start: 2024-01-19 | End: 2024-01-20 | Stop reason: HOSPADM

## 2024-01-19 RX ORDER — ONDANSETRON 4 MG/1
4 TABLET, ORALLY DISINTEGRATING ORAL EVERY 6 HOURS PRN
Status: DISCONTINUED | OUTPATIENT
Start: 2024-01-19 | End: 2024-01-20 | Stop reason: HOSPADM

## 2024-01-19 RX ORDER — TIMOLOL MALEATE 2.5 MG/ML
1 SOLUTION/ DROPS OPHTHALMIC DAILY
COMMUNITY
Start: 2023-11-13

## 2024-01-19 RX ORDER — DEXAMETHASONE SODIUM PHOSPHATE 10 MG/ML
INJECTION, SOLUTION INTRAMUSCULAR; INTRAVENOUS PRN
Status: DISCONTINUED | OUTPATIENT
Start: 2024-01-19 | End: 2024-01-19

## 2024-01-19 RX ADMIN — Medication 2 G: at 12:11

## 2024-01-19 RX ADMIN — GLYCOPYRROLATE 0.2 MG: 0.2 INJECTION INTRAMUSCULAR; INTRAVENOUS at 12:41

## 2024-01-19 RX ADMIN — SODIUM CHLORIDE, POTASSIUM CHLORIDE, SODIUM LACTATE AND CALCIUM CHLORIDE: 600; 310; 30; 20 INJECTION, SOLUTION INTRAVENOUS at 10:58

## 2024-01-19 RX ADMIN — ACETAMINOPHEN 975 MG: 325 TABLET ORAL at 17:29

## 2024-01-19 RX ADMIN — TRANEXAMIC ACID 1950 MG: 650 TABLET ORAL at 10:29

## 2024-01-19 RX ADMIN — SODIUM CHLORIDE, POTASSIUM CHLORIDE, SODIUM LACTATE AND CALCIUM CHLORIDE: 600; 310; 30; 20 INJECTION, SOLUTION INTRAVENOUS at 13:55

## 2024-01-19 RX ADMIN — SODIUM CHLORIDE, POTASSIUM CHLORIDE, SODIUM LACTATE AND CALCIUM CHLORIDE: 600; 310; 30; 20 INJECTION, SOLUTION INTRAVENOUS at 16:06

## 2024-01-19 RX ADMIN — ONDANSETRON 4 MG: 2 INJECTION INTRAMUSCULAR; INTRAVENOUS at 12:27

## 2024-01-19 RX ADMIN — ACETAMINOPHEN 975 MG: 325 TABLET ORAL at 10:29

## 2024-01-19 RX ADMIN — FENTANYL CITRATE 100 MCG: 50 INJECTION INTRAMUSCULAR; INTRAVENOUS at 12:19

## 2024-01-19 RX ADMIN — OXYCODONE HYDROCHLORIDE 10 MG: 5 TABLET ORAL at 16:00

## 2024-01-19 RX ADMIN — PROPOFOL 100 MG: 10 INJECTION, EMULSION INTRAVENOUS at 12:19

## 2024-01-19 RX ADMIN — FENTANYL CITRATE 25 MCG: 50 INJECTION, SOLUTION INTRAMUSCULAR; INTRAVENOUS at 14:44

## 2024-01-19 RX ADMIN — FENTANYL CITRATE 25 MCG: 50 INJECTION, SOLUTION INTRAMUSCULAR; INTRAVENOUS at 14:50

## 2024-01-19 RX ADMIN — PROPOFOL 50 MCG/KG/MIN: 10 INJECTION, EMULSION INTRAVENOUS at 12:27

## 2024-01-19 RX ADMIN — DEXAMETHASONE SODIUM PHOSPHATE 10 MG: 10 INJECTION, SOLUTION INTRAMUSCULAR; INTRAVENOUS at 12:27

## 2024-01-19 RX ADMIN — ATORVASTATIN CALCIUM 20 MG: 10 TABLET, FILM COATED ORAL at 17:29

## 2024-01-19 RX ADMIN — FENTANYL CITRATE 50 MCG: 50 INJECTION, SOLUTION INTRAMUSCULAR; INTRAVENOUS at 11:48

## 2024-01-19 RX ADMIN — SENNOSIDES AND DOCUSATE SODIUM 1 TABLET: 8.6; 5 TABLET ORAL at 21:05

## 2024-01-19 RX ADMIN — PREGABALIN 200 MG: 100 CAPSULE ORAL at 17:29

## 2024-01-19 RX ADMIN — PREGABALIN 200 MG: 100 CAPSULE ORAL at 21:05

## 2024-01-19 RX ADMIN — CEFAZOLIN SODIUM 2 G: 2 INJECTION, SOLUTION INTRAVENOUS at 21:06

## 2024-01-19 RX ADMIN — BUPIVACAINE HYDROCHLORIDE 15 ML: 5 INJECTION, SOLUTION EPIDURAL; INTRACAUDAL; PERINEURAL at 11:48

## 2024-01-19 RX ADMIN — ROCURONIUM BROMIDE 50 MG: 10 INJECTION, SOLUTION INTRAVENOUS at 12:19

## 2024-01-19 RX ADMIN — ASPIRIN 325 MG: 325 TABLET ORAL at 16:00

## 2024-01-19 RX ADMIN — FAMOTIDINE 20 MG: 20 TABLET ORAL at 21:05

## 2024-01-19 RX ADMIN — SUGAMMADEX 200 MG: 100 INJECTION, SOLUTION INTRAVENOUS at 14:10

## 2024-01-19 RX ADMIN — PHENYLEPHRINE HYDROCHLORIDE 0.5 MCG/KG/MIN: 10 INJECTION INTRAVENOUS at 12:24

## 2024-01-19 RX ADMIN — LIDOCAINE HYDROCHLORIDE 5 ML: 10 INJECTION, SOLUTION INFILTRATION; PERINEURAL at 12:19

## 2024-01-19 ASSESSMENT — ACTIVITIES OF DAILY LIVING (ADL)
ADLS_ACUITY_SCORE: 20
ADLS_ACUITY_SCORE: 25
ADLS_ACUITY_SCORE: 25
ADLS_ACUITY_SCORE: 20
ADLS_ACUITY_SCORE: 27
ADLS_ACUITY_SCORE: 20
ADLS_ACUITY_SCORE: 25

## 2024-01-19 ASSESSMENT — LIFESTYLE VARIABLES: TOBACCO_USE: 0

## 2024-01-19 ASSESSMENT — COPD QUESTIONNAIRES: COPD: 0

## 2024-01-19 NOTE — ANESTHESIA POSTPROCEDURE EVALUATION
Patient: Marcio Jenkins    Procedure: Procedure(s):  RIGHT TOTAL KNEE ARTHROPLASTY       Anesthesia Type:  General    Note:     Postop Pain Control: Uneventful            Sign Out: Well controlled pain   PONV: No   Neuro/Psych: Uneventful            Sign Out: Acceptable/Baseline neuro status   Airway/Respiratory: Uneventful            Sign Out: Acceptable/Baseline resp. status   CV/Hemodynamics: Uneventful            Sign Out: Acceptable CV status; No obvious hypovolemia; No obvious fluid overload   Other NRE: NONE   DID A NON-ROUTINE EVENT OCCUR? No           Last vitals:  Vitals Value Taken Time   /91 01/19/24 1520   Temp 36.7  C (98  F) 01/19/24 1500   Pulse 65 01/19/24 1527   Resp 19 01/19/24 1527   SpO2 98 % 01/19/24 1527   Vitals shown include unfiled device data.    Electronically Signed By: Cailin Welch MD  January 19, 2024  4:11 PM

## 2024-01-19 NOTE — OP NOTE
Total Knee Arthroplasty Operative Note        PLAN:  Weight bearing status: Weight bearing as tolerated   Activity: Activity as tolerated  Patient may move about with assist as indicated or with supervision   Anticoagulation plan:                 ASA 325mg po qd  for 42 days  Follow up plan                           Follow up in 2 week(s)        Name: Marcio Jenkins    PCP: Tomas Hooper    Procedure Date: 1/19/2024    Pre-operative diagnosis: Osteoarthritis of right knee [M17.11]   Post-operative diagnosis: Same   Procedure: Total knee arthoplasty (Right)   Surgeon: Sage Levine MD     Assistant(s): Josh Schmidt PA-C   Anesthesia: Spinal Anesthesia   Estimated blood loss: Less than 50 ml   Drains: Hemovac   Specimens: None       Findings: See full dictated operative note for details   Complications: None       Comments: See dictated operative report for full details       Indications:    The patient has experienced progressive right knee pain despite use of  Analgesics, NSAID's, Injection therapy and physical therapy. Because of the failure of these therapies to control symptoms and limited walking, night pain and altered activities the patient has decided to move ahead with joint replacement surgery.    Procedure and Findings:    After being informed of risks, benefits, alternatives to the procedure, patient desired to proceed, brought to the operating suite where they were placed under spinal anesthetic. Patient received 2 grams of intravenous Ancef.  Josh Schmidt PA-C was present for the entire length of the case for the purposes of proper patient positioning, surgical exposure, and patient safety. A time-out verification step was completed.    Examination of the joint surfaces demonstrated full thickness cartilage loss involving themedial compartments of the right knee.    A midline incision, minimally invasive approach was utilized. A para-patellar arthrotomy was performed. Attention was  first directed to the patella. The patella was everted. It was measured at 40 mm. It was resected, remeasured and a 40 mm asymmetric patella was positioned. A protector plate was placed on the patella. Attention was directed toward the distal femur. IM guider clarence was placed. An 10 mm 5 degree distal femoral cut was completed. The IM guide clarence was then placed in the tibia. External guide clarence and tower were utilized and 9 mm button stylus was referenced off the lateral tibial plateau and cuts were completed. The tibia was sized to a 7. Attention was directed towards the distal femur. It was sized to a 6. The 4-in-1 cutting block was placed along the epicondylar axis. It was secured with 2 pins, anterior, posterior and chamfer cuts were completed. Soft tissue balancing was then carried out. Medial release was completed. Ultimately a 14 mm CS insert gave excellent range of motion and stability throughout the range of motion. Patella was noted to track symmetrically throughout the range to motion. The tibial tray rotation was marked, size was verified, it was secured with 2 pins and punched. Trial components were then removed. The cancellous surfaces were pulsatile lavaged. Two batches of Simplex cement was mixed under vacuum. The tibia, femur and patella were sequentially cemented in place. The knee was held in extension with axial compression until the cement had hardened. The 14 mm insert was ultimately selected and secured Care was taken to remove any excess cement. Joint capsular injection with anesthetic solution was performed. Excellent range of motion and stability was demonstrated. A Hemovac drain was placed. The joint was copiously irrigated. Joint capsules were closed with interrupted number 1 running Stratafix. Subcutaneous tissues were closed with 2-0 Vicryl and skin was closed with 3-0 running Stratifix for wound closure and Aquacell. A sterile dressing was applied. Patient tolerated the procedure well  without complication and returned to the PAR in stable condition.      Sage Levine MD    Date: 1/19/2024 Time: 2:08 PM  ?    CONFIDENTIALITY NOTICE This message and any included attachments are from [facility] and are intended only for the addressee. The information contained in this message is confidential and may constitute inside or non-public information under international, federal, or state securities laws. Unauthorized forwarding, printing, copying, distribution, or use of such information is strictly prohibited and may be unlawful. If you are not the addressee, please promptly delete this message and notify the sender of the delivery error by e-mail.

## 2024-01-19 NOTE — ANESTHESIA CARE TRANSFER NOTE
Patient: Marcio Jenkins    Procedure: Procedure(s):  RIGHT TOTAL KNEE ARTHROPLASTY       Diagnosis: Osteoarthritis of right knee [M17.11]  Diagnosis Additional Information: No value filed.    Anesthesia Type:   General     Note:    Oropharynx: oropharynx clear of all foreign objects and spontaneously breathing  Level of Consciousness: drowsy  Oxygen Supplementation: face mask  Level of Supplemental Oxygen (L/min / FiO2): 10  Independent Airway: airway patency satisfactory and stable  Dentition: dentition unchanged  Vital Signs Stable: post-procedure vital signs reviewed and stable  Report to RN Given: handoff report given  Patient transferred to: PACU    Handoff Report: Identifed the Patient, Identified the Reponsible Provider, Reviewed the pertinent medical history, Discussed the surgical course, Reviewed Intra-OP anesthesia mangement and issues during anesthesia, Set expectations for post-procedure period and Allowed opportunity for questions and acknowledgement of understanding      Vitals:  Vitals Value Taken Time   /87 01/19/24 1422   Temp 36.4  C (97.5  F) 01/19/24 1420   Pulse 67 01/19/24 1424   Resp 15 01/19/24 1424   SpO2 99 % 01/19/24 1424   Vitals shown include unfiled device data.    Electronically Signed By: ELIZABETH Parr CRNA  January 19, 2024  2:26 PM

## 2024-01-19 NOTE — ANESTHESIA PROCEDURE NOTES
Adductor canal Procedure Note    Pre-Procedure   Staff -        Anesthesiologist:  Anca Baker MD       Performed By: anesthesiologist       Location: pre-op       Procedure Start/Stop Times: 1/19/2024 11:48 AM and 1/19/2024 11:50 AM       Pre-Anesthestic Checklist: patient identified, IV checked, site marked, risks and benefits discussed, informed consent, monitors and equipment checked, pre-op evaluation, at physician/surgeon's request and post-op pain management  Timeout:       Correct Patient: Yes        Correct Procedure: Yes        Correct Site: Yes        Correct Position: Yes        Correct Laterality: Yes        Site Marked: Yes  Procedure Documentation  Procedure: Adductor canal       Laterality: right       Patient Position: supine       Patient Prep/Sterile Barriers: sterile gloves, mask       Skin prep: Chloraprep       Needle Type: insulated       Needle Gauge: 20.        Needle Length (Inches): 4        Ultrasound guided       1. Ultrasound was used to identify targeted nerve, plexus, vascular marker, or fascial plane and place a needle adjacent to it in real-time.       2. Ultrasound was used to visualize the spread of anesthetic in close proximity to the above referenced structure.       3. A permanent image is entered into the patient's record.       4. The visualized anatomic structures appeared normal.       5. There were no apparent abnormal pathologic findings.    Assessment/Narrative         The placement was negative for: blood aspirated, painful injection and site bleeding       Paresthesias: No.       Bolus given via needle..        Secured via.        Insertion/Infusion Method: Single Shot       Complications: none       Injection made incrementally with aspirations every 5 mL.    Medication(s) Administered   Bupivacaine 0.5% PF (Infiltration) - Infiltration   15 mL - 1/19/2024 11:48:00 AM  Medication Administration Time: 1/19/2024 11:48 AM      FOR [facility] ([facility] [place]) ONLY:   Pain  "Team Contact information: please page the Pain Team Via [facility]. Search \"Pain\". During daytime hours, please page the attending first. At night please page the resident first.      "

## 2024-01-19 NOTE — ANESTHESIA PROCEDURE NOTES
Airway       Patient location during procedure: OR       Procedure Start/Stop Times: 1/19/2024 12:22 PM and 1/19/2024 12:22 PM  Staff -        Anesthesiologist:  Cailin Welch MD       CRNA: Leyla Linda APRN CRNA       Performed By: CRNAIndications and Patient Condition       Indications for airway management: jennifer-procedural       Induction type:intravenous       Mask difficulty assessment: 1 - vent by mask    Final Airway Details       Final airway type: endotracheal airway       Successful airway: ETT - single  Endotracheal Airway Details        ETT size (mm): 8.0       Cuffed: yes       Successful intubation technique: direct laryngoscopy       DL Blade Type: Gross 2       Grade View of Cords: 1       Position: Right       Measured from: lips       Secured at (cm): 24       Bite block used: None    Post intubation assessment        Placement verified by: capnometry, equal breath sounds and chest rise        Number of attempts at approach: 1       Number of other approaches attempted: 0       Secured with: tape       Ease of procedure: easy       Dentition: Intact and Unchanged       Dental guard used and removed. Dental Guard Type: Standard White.    Medication(s) Administered   Medication Administration Time: 1/19/2024 12:22 PM

## 2024-01-19 NOTE — PHARMACY-ADMISSION MEDICATION HISTORY
Pharmacist CATALINA Medication History    Admission medication history is complete. The information provided in this note is only as accurate as the sources available at the time of the update.    Medication reconciliation/reorder completed by provider prior to medication history? No    Information Source(s): Patient and Clinic records and Care Everywhere via in-person    Pertinent Information: N/A    Allergies reviewed with patient and updates made in EHR: yes    Medications available for use during hospital stay: Timolol    Medication History Completed By: Alex Avila [facility] 1/19/2024 10:47 AM    PTA Med List   Medication Sig Last Dose    atorvastatin (LIPITOR) 20 MG tablet [ATORVASTATIN (LIPITOR) 20 MG TABLET] Take 20 mg by mouth daily with supper.  1/18/2024 at PM    cholecalciferol, vitamin D3, 400 unit Tab [CHOLECALCIFEROL, VITAMIN D3, 400 UNIT TAB] Take 400 Units by mouth daily with supper.  1/18/2024 at PM    famotidine (PEPCID) 40 MG tablet Take 40 mg by mouth daily (with dinner) 1/18/2024 at PM    finasteride (PROSCAR) 5 MG tablet Take 5 mg by mouth daily 1/19/2024 at AM    Multiple Vitamins-Minerals (PRESERVISION AREDS 2 PO) Take 1 capsule by mouth daily 1/12/2024 at AM    naproxen sodium (ALEVE) 220 MG tablet Take 220 mg by mouth every 12 hours as needed for moderate pain Stopping 1/12/24 before surgery 1/12/2024    Pregabalin (LYRICA) 200 MG capsule Take 200 mg by mouth 3 times daily 1/19/2024 at AM    propylene glycol/peg 400/PF (SYSTANE, PF, OPHT) Place 1 drop into both eyes 4 times daily as needed (dry eyes) Unknown    timolol maleate (TIMOPTIC) 0.25 % ophthalmic solution Place 1 drop Into the left eye daily 1/19/2024 at AM, has with

## 2024-01-19 NOTE — CONSULTS
"[facility]  Consult Note - Hospitalist Service  Date of Admission:  1/19/2024  Consult Requested by: Dr. Levine  Reason for Consult: med co-management    Assessment & Plan   Marcio Jenkins is a 78 year old male admitted on 1/19/2024. He has a past medical history of hyperlipidemia, reflux, BPH, neuropathy, and osteoarthritis who is here for orthopedic surgery.    The preop visit is reviewed. 1/19/24 labs include K 4.3 Cr 0 .83 and normal cbc including hgb 14.8. Home medications reviewed.      S/p right total knee arthroplasty on 1/19/2024  by Dr. Levine w/ EBL of < 50ccs. Internal Medicine is consulted for medication co-management. Home medications are reconciled from our standpoint.  Seen on pacu. Vitally stable on 2L no respiratory sx being weaned to RA.   Thank you for the consult, we will follow along.   -----  S/p right TKA  A- stable, if unable to wean 02, please page provider for further assessment  P:  --DVT prophylaxis and pain management per primary service  -We will monitor for complications including respiratory concerns, urinary retention, ileus, skin reactions, infections, medication side effects, etc.  -Incentive spirometry   -discharge disposition per primary service  -if he develops any chest pain, check troponin and ekg    Hld  A/p- cont statin    Reflux  A/p stable, cont home pepcid      Bph  A/p- cont proscar, monitor for retention or uti; could add flomax on 1/20 if issues overnight    Neuropathy; postherpetic neuralgia   A/p- resume usual med, further modification per primary team; stable        Clinically Significant Risk Factors Present on Admission                        # Obesity: Estimated body mass index is 32 kg/m  as calculated from the following:    Height as of this encounter: 1.905 m (6' 3\").    Weight as of this encounter: 116.1 kg (256 lb).              Masoud Gaitan MD  Hospitalist Service  Securely message with [employer] (more info)  Text page via " [facility] Paging/Directory   ______________________________________________________________________    Chief Complaint   Knee pain     History is obtained from the patient    History of Present Illness   Marcio Jenkins is a 78 year old male who has a past medical history of hyperlipidemia, reflux, BPH, neuropathy, and osteoarthritis who is here for orthopedic surgery. The preop visit is reviewed. 1/19/24 labs include K 4.3 Cr 0 .83 and normal cbc including hgb 14.8. Home medications reviewed. S/p right total knee arthroplasty on 1/19/2024  by Dr. Levine w/ EBL of < 50ccs. Internal Medicine is consulted for medication co-management.    Pt seen in PACU.  Vitals are stable on 2L weaning, no respiratory sx. Patient has no new symptoms or concerns or complaints.  We discussed home medications.  Discussed pain per primary team as well as DVT prophylaxis.  Patient has no fevers or chills, chest pain, shortness of breath, abdominal pain, neurologic or sensory changes or weakness.  We discussed monitoring for gas.     Patient is from [place]. Discussed with RN in pacu.     Past Medical History    Past Medical History:   Diagnosis Date    Arthritis     BPH (benign prostatic hyperplasia)     Cancer (H)     Squamous cell of skin    Esophageal stricture     First degree AV block     GERD (gastroesophageal reflux disease)     Hearing loss     Hyperlipidemia     Kidney stones     Post-herpetic polyneuropathy     back/nerve stimulator for years    RBBB     Status post insertion of nerve stimulator     In Back, Permanently turned off    Thrombosis 2007    DVT after left knee arthroscopy       Past Surgical History   Past Surgical History:   Procedure Laterality Date    ARTHROSCOPY KNEE Left     CAPSULOTOMY Right     YAG laser right eye    CATARACT EXTRACTION Bilateral 2014    COMBINED CYSTOSCOPY, INSERT STENT URETER(S) Right 04/28/2015    Procedure: CYSTOSCOPY WITH RIGHT URETERAL STENT INSERTION and walker catheter placement;   Surgeon: Tunde Hogan MD;  Location: [facility];  Service:     CYSTOSCOPY PROSTATE W/ LASER N/A 2015    Procedure: CYSTOSCOPY QUANTA LASER TRANSURETHRAL RESECTION OF PROSTATE;  Surgeon: Nichelle Jaime MD;  Location: [facility] OR;  Service:     CYSTOSCOPY PROSTATE W/ LASER N/A 2018    Procedure: CYSTOSCOPY QUANTA LASER TRANS URETHRAL RESECTION OF PROSTATE ;  Surgeon: Tunde Hogan MD;  Location: [facility];  Service:     DACRYOCYSTORHINOSTOMY Right 2020    Procedure: RIGHT EYE EXTERNAL DACRYOCYSTORHINOSTOMY;  Surgeon: Charissa Chatman MD;  Location: [facility];  Service: Ophthalmology    DILATE ESOPHAGUS  2000    HERNIA REPAIR Right 2008    Inguinal hernia repair    LUMBAR NERVE STIMLATOR INSERTION      TOTAL KNEE ARTHROPLASTY Left 2008    VITRECTOMY ANTERIOR Left 2015    WISDOM TOOTH EXTRACTION         Medications   I have reviewed this patient's current medications       Review of Systems    The 10 point Review of Systems is negative other than noted in the HPI or here.      Social History   I have reviewed this patient's social history and updated it with pertinent information if needed.  Social History     Tobacco Use    Smoking status: Former     Types: Cigarettes     Quit date: 1985     Years since quittin.0    Smokeless tobacco: Never   Substance Use Topics    Alcohol use: Yes     Comment: Alcoholic Drinks/day: occasional    Drug use: No         Allergies   No Known Allergies     Physical Exam   Vital Signs: Temp: 97.5  F (36.4  C) Temp src: Temporal BP: (!) 157/84 Pulse: 63   Resp: 10 SpO2: 99 % O2 Device: None (Room air) Oxygen Delivery: 5 LPM  Weight: 256 lbs 0 oz      GENERAL:  Alert, appears comfortable, in no acute distress, appears stated age   HEAD:  Normocephalic, without obvious abnormality, atraumatic   EYES:  PERRL, conjunctiva/corneas clear, no scleral icterus, EOM's intact   NOSE: Nares normal,  "septum midline, mucosa normal, no drainage   THROAT: Lips, mucosa, and tongue normal; teeth and gums normal, mouth moist   NECK: Supple, symmetrical, trachea midline   BACK:   Symmetric, no curvature   LUNGS:   Clear to auscultation bilaterally, no rales, rhonchi, or wheezing, symmetric chest rise on inhalation, respirations unlabored, on 2L   CHEST WALL:  No tenderness or conspicuous deformity   HEART:  Regular rate and rhythm, S1 and S2 normal, no murmur, rub, or gallop, no conspicuous jugular venous distention noted, peripheral pulses intact    ABDOMEN:   Soft, non-tender, bowel sounds auscultated in all four quadrants, no masses, no organomegaly, no rebound or guarding   EXTREMITIES: Right knee wrapped and c/d/I; other limbs- Extremities normal, atraumatic, no cyanosis or edema    SKIN: Dry to touch, no exanthems in the visualized areas or erythema   NEURO: Alert, oriented x3, moves all four extremities of their own volition, strength is 5/5 in 4 limbs    PSYCH: Cooperative and behavior is appropriate       Medical Decision Making       81 MINUTES SPENT BY ME on the date of service doing chart review, history, exam, documentation & further activities per the note.      Data   ------------------------- PAST 24 HR DATA REVIEWED -----------------------------------------------    I have personally reviewed the following data over the past 24 hrs:    5.8  \   14.8   / 225     N/A N/A N/A /  N/A   4.3 N/A 0.83 \       Imaging results reviewed over the past 24 hrs:   Recent Results (from the past 24 hour(s))   POC US Guidance Needle Placement    Narrative    Ultrasound was performed as guidance to an anesthesia procedure.  Click   \"PACS images\" hyperlink below to view any stored images.  For specific   procedure details, view procedure note authored by anesthesia.     "

## 2024-01-19 NOTE — ANESTHESIA PREPROCEDURE EVALUATION
Anesthesia Pre-Procedure Evaluation    Patient: Marcio Jenkins   MRN: [id number] : 1945        Procedure : Procedure(s):  RIGHT TOTAL KNEE ARTHROPLASTY          Past Medical History:   Diagnosis Date    Arthritis     BPH (benign prostatic hyperplasia)     Cancer (H)     Squamous cell of skin    Esophageal stricture     First degree AV block     GERD (gastroesophageal reflux disease)     Hearing loss     Hyperlipidemia     Kidney stones     Post-herpetic polyneuropathy     back/nerve stimulator for years    RBBB     Status post insertion of nerve stimulator     In Back, Permanently turned off    Thrombosis     DVT after left knee arthroscopy      Past Surgical History:   Procedure Laterality Date    ARTHROSCOPY KNEE Left     CAPSULOTOMY Right     YAG laser right eye    CATARACT EXTRACTION Bilateral     COMBINED CYSTOSCOPY, INSERT STENT URETER(S) Right 2015    Procedure: CYSTOSCOPY WITH RIGHT URETERAL STENT INSERTION and walker catheter placement;  Surgeon: Tunde Hogan MD;  Location: [facility];  Service:     CYSTOSCOPY PROSTATE W/ LASER N/A 2015    Procedure: CYSTOSCOPY QUANTA LASER TRANSURETHRAL RESECTION OF PROSTATE;  Surgeon: Nichelle Jaime MD;  Location: [facility];  Service:     CYSTOSCOPY PROSTATE W/ LASER N/A 2018    Procedure: CYSTOSCOPY QUANTA LASER TRANS URETHRAL RESECTION OF PROSTATE ;  Surgeon: Tunde Hogan MD;  Location: [facility] OR;  Service:     DACRYOCYSTORHINOSTOMY Right 2020    Procedure: RIGHT EYE EXTERNAL DACRYOCYSTORHINOSTOMY;  Surgeon: Charissa Chatman MD;  Location: [facility];  Service: Ophthalmology    DILATE ESOPHAGUS  2000    HERNIA REPAIR Right 2008    Inguinal hernia repair    LUMBAR NERVE STIMLATOR INSERTION      TOTAL KNEE ARTHROPLASTY Left 2008    VITRECTOMY ANTERIOR Left 2015    WISDOM TOOTH EXTRACTION        No Known Allergies   Social History     Tobacco Use     "Smoking status: Former     Types: Cigarettes     Quit date: 1985     Years since quittin.0    Smokeless tobacco: Never   Substance Use Topics    Alcohol use: Yes     Comment: Alcoholic Drinks/day: occasional      Wt Readings from Last 1 Encounters:   01/10/24 116.1 kg (256 lb)        Anesthesia Evaluation            ROS/MED HX  ENT/Pulmonary:    (-) tobacco use, asthma, COPD and sleep apnea   Neurologic:  - neg neurologic ROS     Cardiovascular:       METS/Exercise Tolerance:     Hematologic:     (+) History of blood clots,    pt is not anticoagulated,           Musculoskeletal:   (+)  arthritis,             GI/Hepatic:     (+) GERD, Asymptomatic on medication, esophageal disease (s/p stricture dilation greater than 20 years ago, no issues since),                 Renal/Genitourinary:     (+)       Nephrolithiasis  (in past, resolved),       Endo:     (+)               Obesity,       Psychiatric/Substance Use:  - neg psychiatric ROS     Infectious Disease:  - neg infectious disease ROS     Malignancy:  - neg malignancy ROS     Other:  - neg other ROS          Physical Exam    Airway  airway exam normal      Mallampati: I   TM distance: > 3 FB   Neck ROM: full     Respiratory Devices and Support         Dental       (+) Completely normal teeth      Cardiovascular   cardiovascular exam normal          Pulmonary   pulmonary exam normal                OUTSIDE LABS:  CBC:   Lab Results   Component Value Date    WBC 5.8 2024    HGB 14.8 2024    HCT 44.6 2024     2024     BMP:   Lab Results   Component Value Date    POTASSIUM 4.3 2024    CR 0.83 2024     COAGS: No results found for: \"PTT\", \"INR\", \"FIBR\"  POC: No results found for: \"BGM\", \"HCG\", \"HCGS\"  HEPATIC: No results found for: \"ALBUMIN\", \"PROTTOTAL\", \"ALT\", \"AST\", \"GGT\", \"ALKPHOS\", \"BILITOTAL\", \"BILIDIRECT\", \"LUDY\"  OTHER: No results found for: \"PH\", \"LACT\", \"A1C\", \"ASTON\", \"PHOS\", \"MAG\", \"LIPASE\", \"AMYLASE\", \"TSH\", " "\"T4\", \"T3\", \"CRP\", \"SED\"    Anesthesia Plan    ASA Status:  3       Anesthesia Type: General.   Induction: Intravenous.           Consents    Anesthesia Plan(s) and associated risks, benefits, and realistic alternatives discussed. Questions answered and patient/representative(s) expressed understanding.     - Discussed:     - Discussed with:  Patient            Postoperative Care    Pain management: Peripheral nerve block (Single Shot).   PONV prophylaxis: Ondansetron (or other 5HT-3), Dexamethasone or Solumedrol     Comments:    Other Comments: Pt with spinal cord stimulator due to hx of herpetic neuralgia- unclear what levels the leads are at  Will perform general anesthesia with ETT, pt agreeable           KRISTA HORTON MD    I have reviewed the pertinent notes and labs in the chart from the past 30 days and (re)examined the patient.  Any updates or changes from those notes are reflected in this note.              # Obesity: Estimated body mass index is 32 kg/m  as calculated from the following:    Height as of this encounter: 1.905 m (6' 3\").    Weight as of this encounter: 116.1 kg (256 lb).      "

## 2024-01-19 NOTE — INTERVAL H&P NOTE
"I have reviewed the surgical (or preoperative) H&P that is linked to this encounter, and examined the patient. There are no significant changes    Clinical Conditions Present on Arrival:  Clinically Significant Risk Factors Present on Admission                  # Obesity: Estimated body mass index is 32 kg/m  as calculated from the following:    Height as of this encounter: 1.905 m (6' 3\").    Weight as of this encounter: 116.1 kg (256 lb).       "

## 2024-01-19 NOTE — BRIEF OP NOTE
[facility]    Brief Operative Note    Pre-operative diagnosis: Osteoarthritis of right knee [M17.11]  Post-operative diagnosis Same as pre-operative diagnosis    Procedure: RIGHT TOTAL KNEE ARTHROPLASTY, Right - Knee    Surgeon: Surgeon(s) and Role:     * Sage Levine MD - Primary     * Josh Schmidt PA-C - Assisting  Anesthesia: General   Estimated Blood Loss: Less than 50 ml    Drains: Hemovac  Specimens: * No specimens in log *  Findings:   None.  Complications: None.  Implants:   Implant Name Type Inv. Item Serial No.  Lot No. LRB No. Used Action   IMP BONE CEMENT SIMPLEX W/GENTAMICIN 6195-1-001 - [id number] [id number], Bone IMP BONE CEMENT SIMPLEX W/GENTAMICIN 6195-1-001  [employer] [id number] Right 2 Implanted   IMP BASEPLATE TIBIAL HOWM TRI 7 5520-B-700 - [id number] Total Joint Component/Insert IMP BASEPLATE TIBIAL HOWM TRI 7 5520-B-700  [employer] [id number] Right 1 Implanted   IMP COMP FEM STRK TRIATHLN CR RT 6 5510-F-602 - [id number] Total Joint Component/Insert IMP COMP FEM STRK TRIATHLN CR RT 6 5510-F-602  [employer] [id number] Right 1 Implanted   TRIATHLON ASYMMETRIC PATELLA - [id number] Total Joint Component/Insert TRIATHLON ASYMMETRIC PATELLA  [employer] [id number] Right 1 Implanted   INSERT TIB 7 14MM KN X3 CNDRL STAB BRNG TRTHLN STRL LF - [id number] Total Joint Component/Insert INSERT TIB 7 14MM KN X3 CNDRL STAB BRNG TRTHLN STRL LF  [employer] [id number] Right 1 Implanted

## 2024-01-20 ENCOUNTER — APPOINTMENT (OUTPATIENT)
Dept: PHYSICAL THERAPY | Facility: CLINIC | Age: 79
End: 2024-01-20
Attending: ORTHOPAEDIC SURGERY
Payer: COMMERCIAL

## 2024-01-20 ENCOUNTER — APPOINTMENT (OUTPATIENT)
Dept: OCCUPATIONAL THERAPY | Facility: CLINIC | Age: 79
End: 2024-01-20
Attending: ORTHOPAEDIC SURGERY
Payer: COMMERCIAL

## 2024-01-20 VITALS
RESPIRATION RATE: 16 BRPM | TEMPERATURE: 97.7 F | BODY MASS INDEX: 31.83 KG/M2 | HEIGHT: 75 IN | DIASTOLIC BLOOD PRESSURE: 67 MMHG | SYSTOLIC BLOOD PRESSURE: 158 MMHG | HEART RATE: 66 BPM | WEIGHT: 256 LBS | OXYGEN SATURATION: 96 %

## 2024-01-20 LAB
FASTING STATUS PATIENT QL REPORTED: ABNORMAL
GLUCOSE SERPL-MCNC: 140 MG/DL (ref 70–99)
HGB BLD-MCNC: 13.2 G/DL (ref 13.3–17.7)

## 2024-01-20 PROCEDURE — 85018 HEMOGLOBIN: CPT | Performed by: ORTHOPAEDIC SURGERY

## 2024-01-20 PROCEDURE — 82947 ASSAY GLUCOSE BLOOD QUANT: CPT | Performed by: ORTHOPAEDIC SURGERY

## 2024-01-20 PROCEDURE — 250N000013 HC RX MED GY IP 250 OP 250 PS 637: Performed by: STUDENT IN AN ORGANIZED HEALTH CARE EDUCATION/TRAINING PROGRAM

## 2024-01-20 PROCEDURE — 97116 GAIT TRAINING THERAPY: CPT | Mod: GP | Performed by: PHYSICAL THERAPIST

## 2024-01-20 PROCEDURE — 250N000013 HC RX MED GY IP 250 OP 250 PS 637: Performed by: ORTHOPAEDIC SURGERY

## 2024-01-20 PROCEDURE — 99214 OFFICE O/P EST MOD 30 MIN: CPT | Performed by: STUDENT IN AN ORGANIZED HEALTH CARE EDUCATION/TRAINING PROGRAM

## 2024-01-20 PROCEDURE — 97535 SELF CARE MNGMENT TRAINING: CPT | Mod: GO

## 2024-01-20 PROCEDURE — 97161 PT EVAL LOW COMPLEX 20 MIN: CPT | Mod: GP | Performed by: PHYSICAL THERAPIST

## 2024-01-20 PROCEDURE — 250N000011 HC RX IP 250 OP 636: Performed by: ORTHOPAEDIC SURGERY

## 2024-01-20 PROCEDURE — 36415 COLL VENOUS BLD VENIPUNCTURE: CPT | Performed by: ORTHOPAEDIC SURGERY

## 2024-01-20 PROCEDURE — 97166 OT EVAL MOD COMPLEX 45 MIN: CPT | Mod: GO

## 2024-01-20 PROCEDURE — 97110 THERAPEUTIC EXERCISES: CPT | Mod: GP | Performed by: PHYSICAL THERAPIST

## 2024-01-20 RX ORDER — POLYETHYLENE GLYCOL 3350 17 G/17G
17 POWDER, FOR SOLUTION ORAL DAILY
Qty: 510 G | Refills: 1 | Status: SHIPPED | OUTPATIENT
Start: 2024-01-20

## 2024-01-20 RX ADMIN — ACETAMINOPHEN 975 MG: 325 TABLET ORAL at 09:01

## 2024-01-20 RX ADMIN — TIMOLOL MALEATE 1 DROP: 2.5 SOLUTION/ DROPS OPHTHALMIC at 09:05

## 2024-01-20 RX ADMIN — ASPIRIN 325 MG: 325 TABLET ORAL at 09:01

## 2024-01-20 RX ADMIN — PREGABALIN 200 MG: 100 CAPSULE ORAL at 09:01

## 2024-01-20 RX ADMIN — ACETAMINOPHEN 975 MG: 325 TABLET ORAL at 03:18

## 2024-01-20 RX ADMIN — OXYCODONE HYDROCHLORIDE 5 MG: 5 TABLET ORAL at 00:16

## 2024-01-20 RX ADMIN — BENZOCAINE AND MENTHOL 1 LOZENGE: 15; 3.6 LOZENGE ORAL at 01:28

## 2024-01-20 RX ADMIN — FINASTERIDE 5 MG: 5 TABLET, FILM COATED ORAL at 09:01

## 2024-01-20 RX ADMIN — CEFAZOLIN SODIUM 2 G: 2 INJECTION, SOLUTION INTRAVENOUS at 03:18

## 2024-01-20 RX ADMIN — OXYCODONE HYDROCHLORIDE 5 MG: 5 TABLET ORAL at 09:01

## 2024-01-20 RX ADMIN — SENNOSIDES AND DOCUSATE SODIUM 1 TABLET: 8.6; 5 TABLET ORAL at 09:01

## 2024-01-20 ASSESSMENT — ACTIVITIES OF DAILY LIVING (ADL)
ADLS_ACUITY_SCORE: 25
ADLS_ACUITY_SCORE: 23
ADLS_ACUITY_SCORE: 23

## 2024-01-20 NOTE — PROGRESS NOTES
01/20/24 0730   Appointment Info   Signing Clinician's Name / Credentials (PT) Ken Kidd PT   Quick Adds   Quick Adds Certification   Living Environment   People in Home spouse   Current Living Arrangements house   Home Accessibility stairs to enter home   Number of Stairs, Main Entrance 4   Stair Railings, Main Entrance railing on left side (ascending)   Stair Railings, Within Home, Primary railings safe and in good condition   Transportation Anticipated family or friend will provide   Self-Care   Usual Activity Tolerance moderate   Current Activity Tolerance fair   Equipment Currently Used at Home grab bar, tub/shower;shower chair;raised toilet seat   Fall history within last six months no   General Information   Onset of Illness/Injury or Date of Surgery 01/19/24   Referring Physician Sage Levine MD   Patient/Family Therapy Goals Statement (PT) d/c home   Pertinent History of Current Problem (include personal factors and/or comorbidities that impact the POC) s/p R TKA   Weight-Bearing Status - RLE weight-bearing as tolerated   Cognition   Affect/Mental Status (Cognition) WFL   Pain Assessment   Patient Currently in Pain Yes, see Vital Sign flowsheet   Range of Motion (ROM)   ROM Comment decreased ROM s/p R TKA   Bed Mobility   Bed Mobility no deficits identified   Transfers   Transfers sit-stand transfer   Sit-Stand Transfer   Sit-Stand [place] (Transfers) verbal cues;contact guard   Assistive Device (Sit-Stand Transfers) walker, front-wheeled   Comment, (Sit-Stand Transfer) VC for safety and sequencing   Gait/Stairs (Locomotion)   [place] Level (Gait) contact guard   Assistive Device (Gait) walker, front-wheeled   Distance in Feet (Gait) 10   Pattern (Gait) step-to   Deviations/Abnormal Patterns (Gait) antalgic   Clinical Impression   Criteria for Skilled Therapeutic Intervention Yes, treatment indicated   PT Diagnosis (PT) impaired functional mobility   Influenced by the following impairments  pain, decreased ROM, impaired balance, decreased strength   Functional limitations due to impairments gait, stairs, transfers   Clinical Presentation (PT Evaluation Complexity) stable   Clinical Presentation Rationale pt presents as medically diagnosed   Clinical Decision Making (Complexity) low complexity   Planned Therapy Interventions (PT) balance training;bed mobility training;gait training;home exercise program;patient/family education;ROM (range of motion);stair training;strengthening;transfer training   Risk & Benefits of therapy have been explained care plan/treatment goals reviewed;patient   PT Total Evaluation Time   PT Eval, Low Complexity Minutes (93248) 10   Therapy Certification   Start of care date 01/20/24   Certification date from 01/20/24   Certification date to 02/19/24   Medical Diagnosis TKA   Physical Therapy Goals   PT Frequency One time eval and treatment only   PT Goals PT Goal 1;Gait;Transfers;Stairs   PT: Transfers Modified independent;Sit to/from stand;Assistive device   PT: Gait Modified independent;Rolling walker;150 feet   PT: Stairs Supervision/stand-by assist;4 stairs;Rail on left   PT: Goal 1 Independent with written HEP for LE strengthening and ROM   Interventions   Interventions Quick Adds Therapeutic Procedure;Therapeutic Activity;Gait Training   Therapeutic Procedure/Exercise   Ther. Procedure: strength, endurance, ROM, flexibillity Minutes (16656) 15   Symptoms Noted During/After Treatment increased pain   Treatment Detail/Skilled Intervention TKA protocol therex x10 reps, cueing for technique, Independent with written HEP following education   Therapeutic Activity   Treatment Detail/Skilled Intervention sit to/from stand, cueing for safe hand placement and LE positioning, Mod I following education   Gait Training   Gait Training Minutes (65313) 15   Symptoms Noted During/After Treatment (Gait Training) none   Treatment Detail/Skilled Intervention verbal cues for heel strike    Distance in Feet 150   [place] Level (Gait Training) independent   Assistive Device (Gait Training) rolling walker   Pattern Analysis (Gait Training) swing-through gait   Gait Analysis Deviations decreased stride length   Impairments (Gait Analysis/Training) pain;ROM decreased   Stair Railings present on left side   Physical Assist/Nonphysical Assist (Stairs) supervision   Level of [place] (Stairs) stand-by assist   Assistive Device (Stairs) other (see comments)  (climbed 4 steps x2, one step at a time with one rail)   PT Discharge Planning   PT Plan TKA protocol, MET acute PT goals   PT Discharge Recommendation (DC Rec) other (see comments)  (defer to ortho team)   PT Rationale for DC Rec doing well with all mobility, issued HEP and RW, good family support   PT Brief overview of current status Sit to stand and walked with RW x150 feet with Mod Ind and climbs 4 steps with rail with SBA   PT Equipment Needed at Discharge walker, rolling   Total Session Time   Timed Code Treatment Minutes 30   Total Session Time (sum of timed and untimed services) 40   [facility]  OUTPATIENT PHYSICAL THERAPY EVALUATION  PLAN OF TREATMENT FOR OUTPATIENT REHABILITATION  (COMPLETE FOR INITIAL CLAIMS ONLY)  Patient's Last Name, First Name, M.I.  YOB: 1945  Marcio Jenkins                        Provider's Name  [facility] Medical Record No.  [id number]                             Onset Date:  01/19/24   Start of Care Date:  01/20/24   Type:     _X_PT   ___OT   ___SLP Medical Diagnosis:  TKA              PT Diagnosis:  impaired functional mobility Visits from SOC:  1     See note for plan of treatment, functional goals and certification details  Sage Levine MD   I CERTIFY THE NEED FOR THESE SERVICES FURNISHED UNDER        THIS PLAN OF TREATMENT AND WHILE UNDER MY CARE     (Physician co-signature of this document indicates review and  certification of the therapy plan).

## 2024-01-20 NOTE — DISCHARGE SUMMARY
DATE OF ADMISSION:  1/19/2024    DATE OF DISCHARGE:  1/20/2024    ADMISSION DIAGNOSIS:  knee DJD, right    PROCEDURE PERFORMED DURING THIS ADMISSION:  RIGHT TOTAL KNEE ARTHROPLASTY    INDICATION FOR HOSPITAL ADMISSION:  Scott Jenkins is a 78 year old male who presented to the hospital for an elective total knee arthroplasty on the right. He is 15 years s/p the same procedure on the left.  He had known DJD and had failed conservative treatment.  He was admitted for pain control and PT/OT consultation.    HOSPITAL COURSE: Patient underwent successful total knee arthroplasty with Dr Levine on 1/19/24.  He reported good pain control, when seen on 1/20/24, with oral Tylenol and oxycodone. He was meeting with PT/OT for ROM, strengthening and gait training.  He was allowed to discharge home later that day.  He will follow up with orthopedics in 2 weeks.    Jose Luis Gutierrez PA-C  [facility]

## 2024-01-20 NOTE — PROGRESS NOTES
[facility]    Medicine Progress Note - Hospitalist Service    Date of Admission:  1/19/2024    Assessment & Plan   Marcio Jenkins is a 78 year old male admitted on 1/19/2024. He has a past medical history of hyperlipidemia, reflux, BPH, neuropathy, and osteoarthritis who is here for orthopedic surgery.    The preop visit is reviewed. 1/19/24 labs include K 4.3 Cr 0 .83 and normal cbc including hgb 14.8. Home medications reviewed.      S/p right total knee arthroplasty on 1/19/2024  by Dr. Levine w/ EBL of < 50ccs. Internal Medicine is consulted for medication co-management. Home medications are reconciled from our standpoint.  Seen on pacu. Vitally stable on 2L no respiratory sx being weaned to RA.     On 1/20/2024, hgb 13.2, pt is back on Room Air; if pt remains hemodynamically and vitally stable, then cleared from the internal medicine perspective, the final disposition is pending therapies clearance and per surgery/primary team. Medications are reconciled for discharge from our standpoint.     -----  S/p right TKA  A- stable. On Room air.  P:  --DVT prophylaxis and pain management per primary service  -We will monitor for complications including respiratory concerns, urinary retention, ileus, skin reactions, infections, medication side effects, etc.  -Incentive spirometry   -discharge disposition per primary service  -if he develops any chest pain, check troponin and ekg    Hld  A/p- cont statin    Reflux  A/p stable, cont home pepcid      Bph  A/p- cont proscar, monitor for retention or uti; could add flomax on 1/20 if issues overnight    Neuropathy; postherpetic neuralgia   A/p- resume usual med, further modification per primary team; stable             Diet: Advance Diet as Tolerated: Regular Diet Adult  Discharge Instruction - Regular Diet Adult    DVT Prophylaxis: Defer to primary service  Lubin Catheter: Not present  Lines: None     Cardiac Monitoring: None  Code Status: Full  "Code      Clinically Significant Risk Factors Present on Admission                        # Obesity: Estimated body mass index is 32 kg/m  as calculated from the following:    Height as of this encounter: 1.905 m (6' 3\").    Weight as of this encounter: 116.1 kg (256 lb).              Disposition Plan      Expected Discharge Date: 01/20/2024                    Masoud Gaitan MD  Hospitalist Service  [facility]  Securely message with [employer] (more info)  Text page via [employer] Paging/Directory   ______________________________________________________________________    Interval History   -no acute interval events  -seen in early morning, no new symptoms or shortness of breath.  -We discussed disposition per primary team  -passing gas   -All questions answered      Physical Exam   Vital Signs: Temp: 97.6  F (36.4  C) Temp src: Oral BP: (!) 140/63 Pulse: 68   Resp: 12 SpO2: 93 % O2 Device: None (Room air) Oxygen Delivery: 2 LPM  Weight: 256 lbs 0 oz    General: alert, oriented, and in no acute distress  Pulmonary: clear to auscultation bilaterally, normal respiratory effort, on room air, no rales or wheezes or evidence of accessory muscle use  CVS: regular rate and rhythm, no murmurs, rubs, or gallops; no blatant jugular venous distention; no extremity edema and extremities are warm to the touch  GI: soft, nontender, BS+, no rebound or guarding, no conspicuous organomegaly   Neuro: nonfocal, moves all extremities of own volition  Psych: appropriate  Msk- stable      Medical Decision Making       42 MINUTES SPENT BY ME on the date of service doing chart review, history, exam, documentation & further activities per the note.      Data   ------------------------- PAST 24 HR DATA REVIEWED -----------------------------------------------    I have personally reviewed the following data over the past 24 hrs:    5.8  \   13.2 (L)   / 225     N/A N/A N/A /  140 (H)   4.3 N/A 0.83 \       Imaging results " "reviewed over the past 24 hrs:   Recent Results (from the past 24 hour(s))   POC US Guidance Needle Placement    Narrative    Ultrasound was performed as guidance to an anesthesia procedure.  Click   \"PACS images\" hyperlink below to view any stored images.  For specific   procedure details, view procedure note authored by anesthesia.     "

## 2024-01-20 NOTE — PROGRESS NOTES
"[facility]  Orthopedics Progress Note       S:  POD # 1 s/p Procedure(s):  RIGHT TOTAL KNEE ARTHROPLASTY  Patient reports he is doing well.  He feels his pain is controlled.  He has walked, but has not formally done PT yet, that is scheduled for this morning.  He is interested in going home today.      O:  Blood pressure (!) 140/63, pulse 68, temperature 97.6  F (36.4  C), temperature source Oral, resp. rate 12, height 1.905 m (6' 3\"), weight 116.1 kg (256 lb), SpO2 93%.  Lab Results   Component Value Date    HGB 13.2 01/20/2024       Patient is sitting in a chair next to the bed with his right leg lying on pillows.  He is in no acute distress.  His dressing is clean, dry, and intact.  Hemovac remains in place with bloody drainage.  He is able to lift the leg, bend the knee and wiggle the toes without difficulty.  Neurovascular exam is intact.  His legs are soft and nontender to palpation.      A/P:  POD # 1 s/p Procedure(s):  RIGHT TOTAL KNEE ARTHROPLASTY  Hemovac drain may be pulled  Elevate leg at heart level for edema control  Weightbearing as tolerated  Oral medication as needed for pain  Aspirin 325 mg daily x 6 weeks for DVT prophylaxis  PT/OT for gait, transfers and ADL's  Follow up with orthopedics in 2 weeks      Jose Luis Gutierrez PA-C    "

## 2024-01-20 NOTE — PROGRESS NOTES
Patient vital signs are at baseline:Yes  Patient able to ambulate as they were prior to admission or with assist devices provided by therapies during their stay:  Yes assist of one w/ w and GB   Patient MUST void prior to discharge:  Yes  Patient able to tolerate oral intake:  Yes  Pain has adequate pain control using Oral analgesics:  Yes  Does patient have an identified :  Yes  Has goal D/C date and time been discussed with patient:  Yes discharge pending AM therapies and hemovac drain- no output over shift.

## 2024-01-20 NOTE — PROGRESS NOTES
01/20/24 0941   Appointment Info   Signing Clinician's Name / Credentials (OT) Bernice Carrillo OTR/L   Rehab Comments (OT) OT Evaluation   Quick Adds   Quick Adds Certification   Living Environment   People in Home spouse   Current Living Arrangements house   Living Environment Comments Can stay on level- RTS half wall, walk in shower/grab, shower  chair,   Self-Care   Usual Activity Tolerance good   Current Activity Tolerance moderate   Activity/Exercise/Self-Care Comment Pt independent w/ ADL and IADL at baseline.   General Information   Onset of Illness/Injury or Date of Surgery 01/19/24   Referring Physician Sage Levine   Patient/Family Therapy Goal Statement (OT) wants to go home   Additional Occupational Profile Info/Pertinent History of Current Problem TKA   Existing Precautions/Restrictions weight bearing   Cognitive Status Examination   Orientation Status orientation to person, place and time   Affect/Mental Status (Cognitive) WNL   Visual Perception   Visual Impairment/Limitations WFL   Sensory   Sensory Quick Adds sensation intact   Posture   Posture not impaired   Range of Motion Comprehensive   General Range of Motion no range of motion deficits identified   Strength Comprehensive (MMT)   General Manual Muscle Testing (MMT) Assessment no strength deficits identified   Muscle Tone Assessment   Muscle Tone Quick Adds No deficits were identified   Coordination   Upper Extremity Coordination No deficits were identified   Bed Mobility   Bed Mobility supine-sit;sit-supine   Comment (Bed Mobility) SBA-CGA   Transfers   Transfers bed-chair transfer;sit-stand transfer;toilet transfer;shower transfer   Transfer Comments SBA-CGA   Balance   Balance Comments SBA   Clinical Impression   Criteria for Skilled Therapeutic Interventions Met (OT) Yes, treatment indicated   OT Diagnosis decreased ADLs   Influenced by the following impairments TKA   OT Problem List-Impairments impacting ADL activity tolerance  impaired;pain;mobility   Assessment of Occupational Performance 3-5 Performance Deficits   Identified Performance Deficits dressing, bathing, functional mobility, toileting   Planned Therapy Interventions (OT) ADL retraining;bed mobility training;transfer training   Clinical Decision Making Complexity (OT) detailed assessment/moderate complexity   Risk & Benefits of therapy have been explained evaluation/treatment results reviewed;patient   OT Total Evaluation Time   OT Eval, Moderate Complexity Minutes (04822) 10   Therapy Certification   Medical Diagnosis TKA   Start of Care Date 01/20/24   Certification date from 01/20/24   Certification date to 02/19/24   OT Goals   Therapy Frequency (OT) One time eval and treatment   OT Predicted Duration/Target Date for Goal Attainment 01/20/24   Interventions   Interventions Quick Adds Self-Care/Home Management   Self-Care/Home Management   Self-Care/Home Mgmt/ADL, Compensatory, Meal Prep Minutes (09746) 30   Treatment Detail/Skilled Intervention Pt edu on compensatory strategies for LE dressing - pt completed Mod I after instruction using sock aid and reacher. STS w/ SBA and amb. ~15 ft to BR w/ FWW, pain w/ mobility. Pt edu on safety technique for toilet/walkin shower transfer - completed each Mod I. Pt instructed on bed mobility techniques - completed Mod I. Pt edu on sleeping position and car transfers - pt verbalized understanding.   OT Discharge Planning   OT Plan OT d/c   OT Discharge Recommendation (DC Rec) other (see comments)   OT Rationale for DC Rec Pt has good support, familiar with previous knee surgery of ortho precuations and is SBA w/all ADLs and transfers   OT Brief overview of current status SBA w/dressing/transfers   OT Equipment Needed at Discharge   (Sock aid)      [facility]  OUTPATIENT OCCUPATIONAL THERAPY  EVALUATION  PLAN OF TREATMENT FOR OUTPATIENT REHABILITATION  (COMPLETE FOR INITIAL CLAIMS ONLY)  Patient's Last  Name, First Name, M.I.  YOB: 1945  AldersonMarcio mckinney                          Provider's Name  [facility] Medical Record No.  [id number]                             Onset Date:  (P) 01/19/24   Start of Care Date:  (P) 01/20/24   Type:     ___PT   _X_OT   ___SLP Medical Diagnosis:  (P) TKA                    OT Diagnosis:  (P) decreased ADLs Visits from SOC:  1     See note for plan of treatment, functional goals and certification details    I CERTIFY THE NEED FOR THESE SERVICES FURNISHED UNDER        THIS PLAN OF TREATMENT AND WHILE UNDER MY CARE     (Physician co-signature of this document indicates review and certification of the therapy plan).              Sage Levine MD               Occupational Therapy Discharge Summary    Reason for therapy discharge:    All goals and outcomes met, no further needs identified.    Progress towards therapy goal(s). See goals on Care Plan in [facility] electronic health record for goal details.  Goals met    Therapy recommendation(s):    No further therapy is recommended.

## 2024-01-20 NOTE — PROGRESS NOTES
Physical Therapy Discharge Summary    Reason for therapy discharge:    All goals and outcomes met, no further needs identified.    Progress towards therapy goal(s). See goals on Care Plan in [facility] electronic health record for goal details.  Goals met    Therapy recommendation(s):    HEP issued and outpt PT per ortho team

## 2024-01-20 NOTE — PLAN OF CARE
Problem: Knee Arthroplasty  Goal: Optimal Coping  Outcome: Progressing  Goal: Absence of Bleeding  Outcome: Progressing  Goal: Fluid and Electrolyte Balance  Outcome: Progressing  Goal: Optimal Functional Ability  Outcome: Progressing  Intervention: Promote Optimal Functional Status  Recent Flowsheet Documentation  Taken 1/19/2024 1929 by Jayashree Meza RN  Assistive Device Utilized:   walker   gait belt  Activity Management: ambulated outside room  Taken 1/19/2024 1600 by Jayashree Meza RN  Activity Management: activity adjusted per tolerance  Goal: Absence of Infection Signs and Symptoms  Outcome: Progressing  Intervention: Prevent or Manage Infection  Recent Flowsheet Documentation  Taken 1/19/2024 1600 by Jayashree Meza RN  Isolation Precautions: contact precautions maintained  Goal: Intact Neurovascular Status  Outcome: Progressing  Goal: Anesthesia/Sedation Recovery  Outcome: Progressing  Intervention: Optimize Anesthesia Recovery  Recent Flowsheet Documentation  Taken 1/19/2024 1600 by Jayashree Meza RN  Safety Promotion/Fall Prevention:   activity supervised   clutter free environment maintained   nonskid shoes/slippers when out of bed   patient and family education   room door open   room near nurse's station   room organization consistent   safety round/check completed   supervised activity  Goal: Optimal Pain Control and Function  Outcome: Progressing  Intervention: Prevent or Manage Pain  Recent Flowsheet Documentation  Taken 1/19/2024 1814 by Jayashree Meza RN  Pain Management Interventions: food  Taken 1/19/2024 1729 by Jayashree Meza RN  Pain Management Interventions: medication (see MAR)  Taken 1/19/2024 1645 by Jayashree Meza RN  Pain Management Interventions: rest  Taken 1/19/2024 1600 by Jayashree Meza RN  Pain Management Interventions: medication (see MAR)  Goal: Nausea and Vomiting Relief  Outcome: Progressing  Goal: Effective Urinary Elimination  Outcome:  Progressing  Goal: Effective Oxygenation and Ventilation  Outcome: Progressing  Intervention: Optimize Oxygenation and Ventilation  Recent Flowsheet Documentation  Taken 1/19/2024 1929 by Jayashree Meza RN  Head of Bed ([facility]) Positioning: HOB at 20-30 degrees  Taken 1/19/2024 1600 by Jayashree Meza RN  Head of Bed ([facility]) Positioning: HOB at 20-30 degrees    Patient vital signs are at baseline: No,  Reason:  Hypertensive at the start of the shift (168/81); Pagechayito LOZANO who ordered PRN Hydralazine. Pt did not meet parameters to give however. Pt's BP did improve as the shift progressed.  Patient able to ambulate as they were prior to admission or with assist devices provided by therapies during their stay:  Yes  Patient MUST void prior to discharge:  Yes  Patient able to tolerate oral intake:  Yes  Pain has adequate pain control using Oral analgesics:  Yes  Does patient have an identified :  Yes  Has goal D/C date and time been discussed with patient:  Yes    Pt arrived to [address] [place] [address] today at 1534. Pt is A & O x 4 & endorses mild to moderate pain during today's shift. Utilizing scheduled APAP & PRN Oxycodone w/ effective results. ACE wrap dressing to surgical incision is CDI. Hemovac drain in place w/ an output of 210 ml this shift. CMS intact to BLE. Voiding spontaneously. LR running at 100 ml. Tolerating a regular diet. VSS but was running hypertensive at the start of the shift (168/81); Pagechayito LOZANO who ordered PRN Hydralazine. Pt did not meet parameters to give however. Pt's BP did improve as the shift progressed. Anticipating discharge tomorrow pending completion of clinical milestones.     GEORGIE Murguia  Shift: 1500 - 2330

## (undated) DEVICE — KIT DRAIN CLOSED WOUND SUCTION MED 400ML RESVR

## (undated) DEVICE — SUCTION MANIFOLD NEPTUNE 2 SYS 4 PORT 0702-020-000

## (undated) DEVICE — SU STRATAFIX PDS PLUS 2 CTX SPIRAL L14 IN SXPP2B405

## (undated) DEVICE — HOLDER LIMB VELCRO OR 0814-1533

## (undated) DEVICE — GLOVE SURG PI ULTRA TOUCH M SZ 7 LF 42670

## (undated) DEVICE — SOL NACL 0.9% IRRIG 1000ML BOTTLE 2F7124

## (undated) DEVICE — BLADE SAW SAGITTAL STRK WIDE 25.4X85X1.2MM 2108-151-000

## (undated) DEVICE — GLOVE SURG PI ULTRA TOUCH M SZ 8 LF

## (undated) DEVICE — ADH LIQUID MASTISOL TOPICAL VIAL 2-3ML 0523-48

## (undated) DEVICE — SU STRATAFIX MONOCRYL 3-0 SPIRAL PS-2 30CM SXMP1B106

## (undated) DEVICE — CAST PADDING 6" STERILE 9046S

## (undated) DEVICE — DRSG TEGADERM 4X4 3/4" 1626W

## (undated) DEVICE — GLOVE BIOGEL INDICATOR 7.5 LF 41675

## (undated) DEVICE — DECANTER VIAL 2006S

## (undated) DEVICE — SOLUTION IRRIG 2B7127 .9NS 3000ML BAG

## (undated) DEVICE — SUTURE VICRYL+ 2-0 36IN CT-1 UND VCP945H

## (undated) DEVICE — DRSG STERI STRIP 1/2X4" R1547

## (undated) DEVICE — BLADE SAW SAGITTAL STRK 18X90X1.27MM HD SYS 6 6118-127-090

## (undated) DEVICE — DRAPE POUCH INSTRUMENT 3 POCKET 1018L

## (undated) DEVICE — DRSG AQUACEL AG HYDROFIBER  3.5X10" 422605

## (undated) DEVICE — CUSTOM PACK TOTAL KNEE ACCESSORY SOP5BTAHEA

## (undated) DEVICE — CUSTOM PACK TOTAL KNEE SOP5BTKHEC

## (undated) DEVICE — GLOVE UNDER INDICATOR PI SZ 8.5 LF 41685

## (undated) DEVICE — A3 SUPPLIES- SEE NURSING INFO PAGE

## (undated) DEVICE — SOL WATER IRRIG 1000ML BOTTLE 2F7114

## (undated) DEVICE — ELECTRODE PATIENT RETURN ADULT L10 FT 2 PLATE CORD 0855C

## (undated) RX ORDER — ONDANSETRON 2 MG/ML
INJECTION INTRAMUSCULAR; INTRAVENOUS
Status: DISPENSED
Start: 2024-01-19

## (undated) RX ORDER — GLYCOPYRROLATE 0.2 MG/ML
INJECTION, SOLUTION INTRAMUSCULAR; INTRAVENOUS
Status: DISPENSED
Start: 2024-01-19

## (undated) RX ORDER — LIDOCAINE HYDROCHLORIDE 10 MG/ML
INJECTION, SOLUTION EPIDURAL; INFILTRATION; INTRACAUDAL; PERINEURAL
Status: DISPENSED
Start: 2024-01-19

## (undated) RX ORDER — DEXAMETHASONE SODIUM PHOSPHATE 10 MG/ML
INJECTION, EMULSION INTRAMUSCULAR; INTRAVENOUS
Status: DISPENSED
Start: 2024-01-19

## (undated) RX ORDER — PROPOFOL 10 MG/ML
INJECTION, EMULSION INTRAVENOUS
Status: DISPENSED
Start: 2024-01-19

## (undated) RX ORDER — FENTANYL CITRATE 50 UG/ML
INJECTION, SOLUTION INTRAMUSCULAR; INTRAVENOUS
Status: DISPENSED
Start: 2024-01-19